# Patient Record
Sex: MALE | Race: OTHER | NOT HISPANIC OR LATINO | Employment: PART TIME | URBAN - METROPOLITAN AREA
[De-identification: names, ages, dates, MRNs, and addresses within clinical notes are randomized per-mention and may not be internally consistent; named-entity substitution may affect disease eponyms.]

---

## 2017-04-11 ENCOUNTER — OFFICE VISIT (OUTPATIENT)
Dept: URGENT CARE | Facility: CLINIC | Age: 57
End: 2017-04-11
Payer: OTHER MISCELLANEOUS

## 2017-04-11 VITALS
HEART RATE: 88 BPM | TEMPERATURE: 97.7 F | RESPIRATION RATE: 20 BRPM | DIASTOLIC BLOOD PRESSURE: 80 MMHG | SYSTOLIC BLOOD PRESSURE: 120 MMHG | WEIGHT: 245 LBS | OXYGEN SATURATION: 96 % | BODY MASS INDEX: 35.15 KG/M2

## 2017-04-11 DIAGNOSIS — M54.42 CHRONIC BILATERAL LOW BACK PAIN WITH BILATERAL SCIATICA: ICD-10-CM

## 2017-04-11 DIAGNOSIS — G89.29 CHRONIC BILATERAL LOW BACK PAIN WITH BILATERAL SCIATICA: ICD-10-CM

## 2017-04-11 DIAGNOSIS — M54.41 CHRONIC BILATERAL LOW BACK PAIN WITH BILATERAL SCIATICA: ICD-10-CM

## 2017-04-11 PROCEDURE — 99214 OFFICE O/P EST MOD 30 MIN: CPT | Performed by: PHYSICIAN ASSISTANT

## 2017-04-11 RX ORDER — GABAPENTIN 600 MG/1
600 TABLET ORAL 2 TIMES DAILY
Qty: 14 TAB | Refills: 0 | Status: SHIPPED | OUTPATIENT
Start: 2017-04-11 | End: 2017-04-18

## 2017-04-14 ASSESSMENT — ENCOUNTER SYMPTOMS
LOSS OF CONSCIOUSNESS: 0
SENSORY CHANGE: 1
FEVER: 0
FOCAL WEAKNESS: 0
WHEEZING: 0
LEG PAIN: 1
SORE THROAT: 0
EYE DISCHARGE: 0
ABDOMINAL PAIN: 0
DIARRHEA: 0
COUGH: 0
BACK PAIN: 1
EYE REDNESS: 0
SEIZURES: 0
TINGLING: 1
CHILLS: 0

## 2017-04-14 NOTE — PROGRESS NOTES
Subjective:      Johnie Jarrell is a 56 y.o. male who presents with Leg Pain          Pt is 55 y/o male who presents with continued low back pain with radiation to the back of his legs for the last 4-5 years. Pt admits that he is here today for some kind of relief to get him to his appt. With Dr. White neurosurgery in June. He has had several studies and referrals- it was suspected that this pt. Had claudication- however was without significant arterial disease noted on ultrasound. He also recently had an MRI of what he admits was of his lumbar spine- I am unable to see such in the chart. He has recently been to Dr. Castrejon who provided an injection into his hip/pelvis that provided momentary relief for this patient.   Pt. Has been to PT in the last few years for the same back pain. He denies any recent changes, injury, trauma or change from baseline for his typical back pain. He denies any new leg weakness. He reports that he mainly has the pain while standing in a certain position or while walking and will have pain shoot down his legs and calves. He will have some relief if he sits down in a certain position. He admits that Gabapentin has been helpful slightly in the past.      Leg Pain  This is a chronic problem. Episode onset: years. The problem occurs intermittently. The problem has been waxing and waning. Pertinent negatives include no abdominal pain, chest pain, chills, coughing, fever, rash or sore throat. The symptoms are aggravated by walking and standing. He has tried acetaminophen, ice, heat, NSAIDs, rest, oral narcotics, position changes and relaxation (Physical therapy. ) for the symptoms.       Review of Systems   Constitutional: Negative for fever, chills and malaise/fatigue.   HENT: Negative for sore throat.    Eyes: Negative for discharge and redness.   Respiratory: Negative for cough and wheezing.    Cardiovascular: Negative for chest pain and leg swelling.   Gastrointestinal: Negative for  abdominal pain and diarrhea.   Musculoskeletal: Positive for back pain. Negative for joint pain.   Skin: Negative for itching and rash.   Neurological: Positive for tingling and sensory change. Negative for focal weakness, seizures and loss of consciousness.          Objective:     /80 mmHg  Pulse 88  Temp(Src) 36.5 °C (97.7 °F)  Resp 20  Wt 111.131 kg (245 lb)  SpO2 96%   PMH:  has a past medical history of Allergy, unspecified not elsewhere classified. He also has no past medical history of Asthma.  MEDS:   Current outpatient prescriptions:   •  gabapentin (NEURONTIN) 600 MG tablet, Take 1 Tab by mouth 2 times a day for 7 days. Caution as may cause sedation, Disp: 14 Tab, Rfl: 0  •  Diclofenac Sodium 2 % Solution, Apply  to skin as directed., Disp: , Rfl:   ALLERGIES:   Allergies   Allergen Reactions   • Pcn [Penicillins]      SURGHX: History reviewed. No pertinent past surgical history.  SOCHX:  reports that he has never smoked. He has never used smokeless tobacco. He reports that he does not drink alcohol or use illicit drugs.  FH: Family history was reviewed, no pertinent findings to report    Physical Exam   Constitutional: He is oriented to person, place, and time. He appears well-developed and well-nourished.   HENT:   Head: Normocephalic and atraumatic.   Eyes: EOM are normal. Pupils are equal, round, and reactive to light.   Neck: Normal range of motion. Neck supple.   Cardiovascular: Normal rate.    Pulmonary/Chest: Effort normal and breath sounds normal.   Musculoskeletal:   Spine- without midline tenderness, step-off or deformity. Unable to illicit pain on exam today.  Without scoliosis or kyphosis. Without noted paraspinous spasm. FROM with lateral bend, and flexion/extension. Minimal tenderness over bilateral sacroiliac notches.  Sensation intact bilaterally, BLE motor 4/5 - noted quad atrophy. and symmetrical  strength. Pos.  Straight leg raise bilaterally   Gait- slow without foot drop.  Calves- without significant edema or erythema.    Neurological: He is alert and oriented to person, place, and time.   Skin: Skin is warm. No rash noted.   Psychiatric: He has a normal mood and affect. His behavior is normal.   Vitals reviewed.              Assessment/Plan:     1. Chronic bilateral low back pain with bilateral sciatica  - gabapentin (NEURONTIN) 600 MG tablet; Take 1 Tab by mouth 2 times a day for 7 days. Caution as may cause sedation  Dispense: 14 Tab; Refill: 0    Discussed at length today the role of the various specialist he has been evaluated by - it appears that he was sent to Dr. Castrejon to assist with pain management- and due to such referral- I do not feel comfortable writing controlled meds- will trial Gabapentin- as there is no change from baseline and without any new symptoms, injury or trauma- will defer further imaging at this time to specialist or his PCP-  pt. Is to trial Gabapentin and immediately alert his PCP of any change to symptoms and further monitoring.   He is to keep appt. With Dr. White in the near future- as it does sound like back pain with neuro claudication like symptoms- even spinal stenosis.   Pt was in agreement and will alert his PCP of his decision to no longer go to Dr. Castrejon.     Patient given precautionary s/sx that mandate immediate follow up and evaluation in the ED. Advised of risks of not doing so.    DDX, Supportive care, and indications for immediate follow-up discussed with patient.    Instructed to return to clinic or nearest emergency department if we are not available for any change in condition, further concerns, or worsening of symptoms.    The patient demonstrated a good understanding and agreed with the treatment plan.

## 2017-04-18 ENCOUNTER — OFFICE VISIT (OUTPATIENT)
Dept: MEDICAL GROUP | Age: 57
End: 2017-04-18
Payer: OTHER MISCELLANEOUS

## 2017-04-18 VITALS
BODY MASS INDEX: 35.55 KG/M2 | HEART RATE: 102 BPM | SYSTOLIC BLOOD PRESSURE: 117 MMHG | DIASTOLIC BLOOD PRESSURE: 80 MMHG | TEMPERATURE: 96.6 F | OXYGEN SATURATION: 96 % | HEIGHT: 69 IN | WEIGHT: 240 LBS

## 2017-04-18 DIAGNOSIS — G89.29 CHRONIC LEFT-SIDED LOW BACK PAIN WITH LEFT-SIDED SCIATICA: ICD-10-CM

## 2017-04-18 DIAGNOSIS — E66.9 OBESITY (BMI 30-39.9): ICD-10-CM

## 2017-04-18 DIAGNOSIS — E78.2 MIXED HYPERLIPIDEMIA: ICD-10-CM

## 2017-04-18 DIAGNOSIS — M54.42 CHRONIC LEFT-SIDED LOW BACK PAIN WITH LEFT-SIDED SCIATICA: ICD-10-CM

## 2017-04-18 PROCEDURE — 99214 OFFICE O/P EST MOD 30 MIN: CPT | Performed by: INTERNAL MEDICINE

## 2017-04-18 RX ORDER — CYCLOBENZAPRINE HCL 10 MG
10 TABLET ORAL 3 TIMES DAILY PRN
Qty: 30 TAB | Refills: 0 | Status: SHIPPED | OUTPATIENT
Start: 2017-04-18 | End: 2017-06-13

## 2017-04-18 RX ORDER — NAPROXEN 500 MG/1
500 TABLET ORAL 2 TIMES DAILY WITH MEALS
Qty: 60 TAB | Refills: 0 | Status: SHIPPED | OUTPATIENT
Start: 2017-04-18 | End: 2017-06-13

## 2017-04-18 ASSESSMENT — ENCOUNTER SYMPTOMS
NEUROLOGICAL NEGATIVE: 1
EYES NEGATIVE: 1
PSYCHIATRIC NEGATIVE: 1
RESPIRATORY NEGATIVE: 1
GASTROINTESTINAL NEGATIVE: 1
MUSCULOSKELETAL NEGATIVE: 1
CARDIOVASCULAR NEGATIVE: 1
CONSTITUTIONAL NEGATIVE: 1

## 2017-04-18 NOTE — PROGRESS NOTES
"Subjective:      Johnie Jarrell is a 56 y.o. male who presents with Referral Needed   the patient is here for follow-up of his chronic sacral pain radiating down the inner aspect of both legs for the last several months and wants referral to neurosurgeon for possible surgical management of this. He is followed by pain management Dr. Castrejon. He said Dr. Castrejon Him an injection in his groin which did not help in any.\He's not had any epidural injections apparently recently. He had an MRI in October which we don't have the results of that renal diagnostic center and also an MRI will of the pelvis which was done last month which showed bilateral acetabular impingement . The patient has not seen Dr. White yet. He says he wants to see him because a friend of his helped him with his pain. The patient denies any low back pain however. His only pain is in the rectal area and inner thighs.         HPI    Review of Systems   Constitutional: Negative.    HENT: Negative.    Eyes: Negative.    Respiratory: Negative.    Cardiovascular: Negative.    Gastrointestinal: Negative.    Genitourinary: Negative.    Musculoskeletal: Negative.    Skin: Negative.    Neurological: Negative.    Endo/Heme/Allergies: Negative.    Psychiatric/Behavioral: Negative.           Objective:     /80 mmHg  Pulse 102  Temp(Src) 35.9 °C (96.6 °F)  Ht 1.759 m (5' 9.25\")  Wt 108.863 kg (240 lb)  BMI 35.18 kg/m2  SpO2 96%     Physical Exam   Constitutional: He is oriented to person, place, and time. He appears well-developed and well-nourished. No distress.   HENT:   Head: Normocephalic and atraumatic.   Right Ear: External ear normal.   Left Ear: External ear normal.   Mouth/Throat: Oropharynx is clear and moist. No oropharyngeal exudate.   Eyes: Conjunctivae and EOM are normal. Pupils are equal, round, and reactive to light. Right eye exhibits no discharge.   Neck: Normal range of motion. Neck supple. No JVD present. No tracheal deviation " present. No thyromegaly present.   Cardiovascular: Normal rate, regular rhythm, normal heart sounds and intact distal pulses.  Exam reveals no gallop.    Pulmonary/Chest: Effort normal and breath sounds normal. No respiratory distress. He has no wheezes. He has no rales. He exhibits no tenderness.   Abdominal: Soft. Bowel sounds are normal. He exhibits no distension and no mass. There is no tenderness. There is no rebound and no guarding. No hernia.   Genitourinary: Guaiac negative stool. No penile tenderness.   Musculoskeletal: He exhibits no edema or tenderness.   Lymphadenopathy:     He has no cervical adenopathy.   Neurological: He is alert and oriented to person, place, and time. He has normal reflexes. He displays normal reflexes. No cranial nerve deficit. He exhibits normal muscle tone. Coordination normal.   Skin: Skin is warm and dry. No rash noted. He is not diaphoretic. No erythema. No pallor.   Psychiatric: He has a normal mood and affect. His behavior is normal. Judgment and thought content normal.   Nursing note and vitals reviewed.  No visits with results within 1 Month(s) from this visit.  Latest known visit with results is:    Office Visit on 03/30/2015   Component Date Value   • POC Color 03/30/2015 yellow    • POC Appearance 03/30/2015 clear    • POC Leukocyte Esterase 03/30/2015 neg    • POC Nitrites 03/30/2015 neg    • POC Urobiligen 03/30/2015 neg    • POC Protein 03/30/2015 neg    • POC Urine PH 03/30/2015 5    • POC Blood 03/30/2015 neg    • POC Specific Gravity 03/30/2015 1.015    • POC Ketones 03/30/2015 neg    • POC Biliruben 03/30/2015 neg    • POC Glucose 03/30/2015 neg       No results found for: HBA1C  Lab Results   Component Value Date/Time    SODIUM 141 10/29/2014 08:37 AM    POTASSIUM 3.9 10/29/2014 08:37 AM    CHLORIDE 107 10/29/2014 08:37 AM    CO2 28 10/29/2014 08:37 AM    GLUCOSE 91 10/29/2014 08:37 AM    BUN 13 10/29/2014 08:37 AM    CREATININE 0.99 10/29/2014 08:37 AM     ALKALINE PHOSPHATASE 58 10/29/2014 08:37 AM    AST(SGOT) 19 10/29/2014 08:37 AM    ALT(SGPT) 16 10/29/2014 08:37 AM    TOTAL BILIRUBIN 0.7 10/29/2014 08:37 AM     No results found for: INR  Lab Results   Component Value Date/Time    CHOLESTEROL, 10/29/2014 08:37 AM    * 10/29/2014 08:37 AM    HDL 50 10/29/2014 08:37 AM    TRIGLYCERIDES 84 10/29/2014 08:37 AM       No results found for: TESTOSTERONE  No results found for: TSH  No results found for: FREET4  No results found for: URICACID  No components found for: VITB12  No results found for: 25HYDROXY                Assessment/Plan:     1. Chronic left-sided low back pain with left-sided sciatica-the patient most likely has bilateral acetabular impingement syndrome causing his symptoms but cannot entirely exclude lumbosacral disc disease with radiculopathy. Would need to see the MRI results and report before making this diagnosis however. We'll refer him to the neurosurgeon at his request for second opinion. It may be the patient will actually need to see an orthopedic specialist if the problem is more related to his hip joint than to the LS-spine. Any type physical therapy is been ordered for both back and hip problems. Continue follow-up with his physical medicine doctor, Dr. Castrejon. In the meantime Flexeril and NSAIDs for pain and muscle spasm. Try to avoid opiates.     - REFERRAL TO NEUROSURGERY  - REFERRAL TO PHYSICAL THERAPY Reason for Therapy: Eval/Treat/Report             3. Mixed hyperlipidemia    diet/exercise/lose 15 lbs.; patient counseled      4. Obesity (BMI 30-39.9)      diet/exercise/lose 15 lbs.; patient counseled

## 2017-04-18 NOTE — MR AVS SNAPSHOT
"        Johnie Hernandezfredo   2017 1:00 PM   Office Visit   MRN: [id number]    Department:  [address] [place] [address]   Dept Phone:  [phone]    Description:  Male : 1960   Provider:  Karl Brito M.D.           Reason for Visit     Referral Needed Wants referall to Dr. White      Allergies as of 2017     Allergen Noted Reactions    Pcn [Penicillins] 2009         You were diagnosed with     Chronic left-sided low back pain with left-sided sciatica   [4937223]       BMI 36.0-36.9,adult   [862570]       Mixed hyperlipidemia   [272.2.ICD-9-CM]       Obesity (BMI 30-39.9)   [406890]         Vital Signs     Blood Pressure Pulse Temperature Height Weight Body Mass Index    117/80 mmHg 102 35.9 °C (96.6 °F) 1.759 m (5' 9.25\") 108.863 kg (240 lb) 35.18 kg/m2    Oxygen Saturation Smoking Status                96% Never Smoker           Basic Information     Date Of Birth Sex Race Ethnicity Preferred Language    1960 Male Other Unknown English      Your appointments     2017  5:30 PM   ACU GROUP with Harris Thorpe M.D.   [facility] (--)    [address] [facility].  [place] [id number] [phone]            May 20, 2017  7:00 AM   Adult Draw/Collection with [facility]   [facility] (--)    [address] [place] [address]   [phone]            2017  8:00 AM   Established Patient with Karl Brito M.D.   [address])     [place] [id number] [phone]           You will be receiving a confirmation call a few days before your appointment from our automated call confirmation system.              Problem List              ICD-10-CM Priority Class Noted - Resolved    HLD (hyperlipidemia) E78.5   2015 - Present    BMI 36.0-36.9,adult Z68.36   2016 - Present    Leg pain, left- since ; neg US venous, - ref PT M79.605   2016 - Present    Chronic left-sided low back pain " with left-sided sciatica M54.42, G89.29   12/8/2016 - Present    Obesity (BMI 30-39.9) E66.9   12/8/2016 - Present      Health Maintenance        Date Due Completion Dates    IMM DTaP/Tdap/Td Vaccine (1 - Tdap) 8/12/1981 ---    COLONOSCOPY 6/29/2025 6/29/2015            Current Immunizations     Influenza Vaccine Quad Inj (Pf) 12/8/2016      Below and/or attached are the medications your provider expects you to take. Review all of your home medications and newly ordered medications with your provider and/or pharmacist. Follow medication instructions as directed by your provider and/or pharmacist. Please keep your medication list with you and share with your provider. Update the information when medications are discontinued, doses are changed, or new medications (including over-the-counter products) are added; and carry medication information at all times in the event of emergency situations     Allergies:  PCN - (reactions not documented)               Medications  Valid as of: April 18, 2017 -  1:44 PM    Generic Name Brand Name Tablet Size Instructions for use    Cyclobenzaprine HCl (Tab) FLEXERIL 10 MG Take 1 Tab by mouth 3 times a day as needed.        Diclofenac Sodium (Solution) Diclofenac Sodium 2 % Apply  to skin as directed.        Gabapentin (Tab) NEURONTIN 600 MG Take 1 Tab by mouth 2 times a day for 7 days. Caution as may cause sedation        Naproxen (Tab) NAPROSYN 500 MG Take 1 Tab by mouth 2 times a day, with meals.        .                 Medicines prescribed today were sent to:     [id number] - [place] - [address] [facility]    [address] [facility] [address]    Phone: [phone] Fax: [phone]    Open 24 Hours?: No      Medication refill instructions:       If your prescription bottle indicates you have medication refills left, it is not necessary to call your provider’s office. Please contact your pharmacy and they will refill your medication.    If your prescription bottle indicates  you do not have any refills left, you may request refills at any time through one of the following ways: The online [employer] system (except Urgent Care), by calling your provider’s office, or by asking your pharmacy to contact your provider’s office with a refill request. Medication refills are processed only during regular business hours and may not be available until the next business day. Your provider may request additional information or to have a follow-up visit with you prior to refilling your medication.   *Please Note: Medication refills are assigned a new Rx number when refilled electronically. Your pharmacy may indicate that no refills were authorized even though a new prescription for the same medication is available at the pharmacy. Please request the medicine by name with the pharmacy before contacting your provider for a refill.        Referral     A referral request has been sent to our patient care coordination department. Please allow 3-5 business days for us to process this request and contact you either by phone or mail. If you do not hear from us by the 5th business day, please call us at [phone].           Elevate Access Code: Activation code not generated  Current Elevate Status: Active

## 2017-04-20 ENCOUNTER — APPOINTMENT (OUTPATIENT)
Dept: OTHER | Facility: IMAGING CENTER | Age: 57
End: 2017-04-20

## 2017-04-20 PROCEDURE — 97530 THERAPEUTIC ACTIVITIES: CPT | Performed by: FAMILY MEDICINE

## 2017-05-04 ENCOUNTER — APPOINTMENT (OUTPATIENT)
Dept: OTHER | Facility: IMAGING CENTER | Age: 57
End: 2017-05-04

## 2017-05-04 PROCEDURE — 97530 THERAPEUTIC ACTIVITIES: CPT | Performed by: FAMILY MEDICINE

## 2017-05-17 ENCOUNTER — OFFICE VISIT (OUTPATIENT)
Dept: OTHER | Facility: IMAGING CENTER | Age: 57
End: 2017-05-17
Payer: OTHER MISCELLANEOUS

## 2017-05-17 DIAGNOSIS — G89.29 CHRONIC LEFT-SIDED LOW BACK PAIN WITH LEFT-SIDED SCIATICA: Primary | ICD-10-CM

## 2017-05-17 DIAGNOSIS — M54.42 CHRONIC LEFT-SIDED LOW BACK PAIN WITH LEFT-SIDED SCIATICA: Primary | ICD-10-CM

## 2017-05-17 DIAGNOSIS — M48.062 SPINAL STENOSIS, LUMBAR REGION, WITH NEUROGENIC CLAUDICATION: ICD-10-CM

## 2017-05-17 PROCEDURE — 97811 ACUP 1/> W/O ESTIM EA ADD 15: CPT | Performed by: FAMILY MEDICINE

## 2017-05-17 PROCEDURE — 99213 OFFICE O/P EST LOW 20 MIN: CPT | Mod: 25 | Performed by: FAMILY MEDICINE

## 2017-05-17 PROCEDURE — 97813 ACUP 1/> W/ESTIM 1ST 15 MIN: CPT | Performed by: FAMILY MEDICINE

## 2017-05-17 NOTE — MR AVS SNAPSHOT
Johnie Wesly   2017 5:00 PM   Office Visit   MRN: [id number]    Department:  Acupuncture Med   Dept Phone:  [phone]    Description:  Male : 1960   Provider:  Harris Thorpe M.D.           Allergies as of 2017     Allergen Noted Reactions    Pcn [Penicillins] 2009         You were diagnosed with     Chronic left-sided low back pain with left-sided sciatica   [8758225]  -  Primary     Spinal stenosis, lumbar region, with neurogenic claudication   [724.03.ICD-9-CM]         Vital Signs     Smoking Status                   Never Smoker            Basic Information     Date Of Birth Sex Race Ethnicity Preferred Language    1960 Male Other Unknown English      Your appointments     May 20, 2017  7:00 AM   Adult Draw/Collection with LAB [facility]   [facility] (--)    [address] [place] [address]   [phone]            May 31, 2017  1:30 PM   ACU GROUP with Harris Thorpe M.D.   [facility] (--)    [address] [facility]  [place] [id number] [phone]            2017  8:00 AM   Established Patient with Karl Brito M.D.   [facility] GROUP [address] ([facility])    [address] [place] [id number] [phone]           You will be receiving a confirmation call a few days before your appointment from our automated call confirmation system.              Problem List              ICD-10-CM Priority Class Noted - Resolved    HLD (hyperlipidemia) E78.5   2015 - Present    BMI 36.0-36.9,adult Z68.36   2016 - Present    Leg pain, left- since ; neg US venous, - ref PT M79.605   2016 - Present    Chronic left-sided low back pain with left-sided sciatica M54.42, G89.29   2016 - Present    Obesity (BMI 30-39.9) E66.9   2016 - Present    Spinal stenosis, lumbar region, with neurogenic claudication M48.06   2017 - Present      Health Maintenance        Date Due Completion Dates    IMM  DTaP/Tdap/Td Vaccine (1 - Tdap) 8/12/1981 ---    COLONOSCOPY 6/29/2025 6/29/2015            Current Immunizations     Influenza Vaccine Quad Inj (Pf) 12/8/2016      Below and/or attached are the medications your provider expects you to take. Review all of your home medications and newly ordered medications with your provider and/or pharmacist. Follow medication instructions as directed by your provider and/or pharmacist. Please keep your medication list with you and share with your provider. Update the information when medications are discontinued, doses are changed, or new medications (including over-the-counter products) are added; and carry medication information at all times in the event of emergency situations     Allergies:  PCN - (reactions not documented)               Medications  Valid as of: May 17, 2017 -  5:58 PM    Generic Name Brand Name Tablet Size Instructions for use    Cyclobenzaprine HCl (Tab) FLEXERIL 10 MG Take 1 Tab by mouth 3 times a day as needed.        Diclofenac Sodium (Solution) Diclofenac Sodium 2 % Apply  to skin as directed.        Naproxen (Tab) NAPROSYN 500 MG Take 1 Tab by mouth 2 times a day, with meals.        .                 Medicines prescribed today were sent to:     [id number] [place] [place] [facility] [address] [facility]    [address] [facility] [address]    Phone: [phone] Fax: [phone]    Open 24 Hours?: No      Medication refill instructions:       If your prescription bottle indicates you have medication refills left, it is not necessary to call your provider’s office. Please contact your pharmacy and they will refill your medication.    If your prescription bottle indicates you do not have any refills left, you may request refills at any time through one of the following ways: The online [employer] system (except Urgent Care), by calling your provider’s office, or by asking your pharmacy to contact your provider’s office with a refill request. Medication refills are  processed only during regular business hours and may not be available until the next business day. Your provider may request additional information or to have a follow-up visit with you prior to refilling your medication.   *Please Note: Medication refills are assigned a new Rx number when refilled electronically. Your pharmacy may indicate that no refills were authorized even though a new prescription for the same medication is available at the pharmacy. Please request the medicine by name with the pharmacy before contacting your provider for a refill.        Instructions    The side effects of Acupuncture needle insertion include: minor bruising, bleeding, or pain at the site of needle insertion.  If more worrisome symptoms, such as continued bleeding, severe bruising, or continue pain or altered sensation persist, please contact [facility] office @ [phone]            Proviation Access Code: Activation code not generated  Current Proviation Status: Active

## 2017-05-18 NOTE — PROGRESS NOTES
[facility] Medical Acupuncture Progress Note  [address] [place] [address]  Dept: [phone]      Patient Name: Alessandra Jarrell   MRN: [id number]  YOB: 1960  PCP: Karl Brito M.D.  Date of Service: 5/17/2017  5:01 PM    CC New Patient - alessandra - previously seen in group acupuncture   HPI L groin burning.   No benefit from last treatment in Group acupuncture  Back is tight.  Medial thigh L is affected the most.   He states that he has burning to the medial L thigh.  He was checked for a hernia in the past with an MRI, and this was negative.  He has a recent MRI L-Spine as well, likely done at [facility]    He was told that he could get injections to the back or surgery, but he isn't interested in either at this point until he finishes a course of acupuncture    Notes from Neurosurgeon Dr. White reveal that the patient has a tight central canal stenosis of L3-L5, requiring surgery.  As the patient has asked for alternative methods, a referral was made to Dr. Bai for a L-Spine epidural.     ROS Favorite blue color like the dominick  - looks open and light.  Winter too cool.  Summer - likes the warmth  [place] born between [facility], [facility] ? Night ?3AM  Works in [employer] -   Used to play soccer =-  Like -  Soccer.    , Has 3 kids - 29, 27, 11.        PMH Past Medical History   Diagnosis Date   • Allergy, unspecified not elsewhere classified      No past surgical history on file.   SH Social History     Social History   • Marital Status:      Spouse Name: N/A   • Number of Children: N/A   • Years of Education: N/A     Social History Main Topics   • Smoking status: Never Smoker    • Smokeless tobacco: Never Used   • Alcohol Use: No   • Drug Use: No   • Sexual Activity:     Partners: Female     Other Topics Concern   • None     Social History Narrative      MEDS Current Outpatient Prescriptions on File Prior to Visit   Medication Sig Dispense Refill   • cyclobenzaprine  (FLEXERIL) 10 MG Tab Take 1 Tab by mouth 3 times a day as needed. 30 Tab 0   • naproxen (NAPROSYN) 500 MG Tab Take 1 Tab by mouth 2 times a day, with meals. 60 Tab 0   • Diclofenac Sodium 2 % Solution Apply  to skin as directed.       No current facility-administered medications on file prior to visit.      ALLERGIES Allergies   Allergen Reactions   • Pcn [Penicillins]       PE Pulses - not examined today  Tongue - not examined today     Assesment [place], Likely weak body  KI/ LR Stagnation Qi    Western Encounter Diagnoses   Name Primary?   • Chronic left-sided low back pain with left-sided sciatica Yes   • Spinal stenosis, lumbar region, with neurogenic claudication                   Plan Set 1: LLE - KI8-->+++KI10 / SP6-->+++SP9 / LR5-->+++LR8  Set 1: RLE BL59-->Bl40 / GB39-->BL34   Set 2: R ear BFA x 5  Set 3: Bilateral FMS  Set 4: Mateus Yin José Ralph 3:6   Have encouraged the patient to rest after the acupuncture session today - taking naps or going to sleep early as necessary.  Increase intake of water and refrain from strenuous activities.  Patient may expect to feel transient worsening of symptoms, but this should resolve to benefit in the next day or two after treatment.  >16 minutes of this 30 minute interview were spent in discussing the benefits and utility of acupuncture.  I answered patient questions about efficacy, safety, and what to expect during a treatment, and the likely number of treatments needed.  If not improved with this 3rd treatment of acupuncture, patient will consider epidural steroid and/or spinal surgery.   Total Acupuncture Time = 30 minutes    Harris Thorpe M.D.

## 2017-05-18 NOTE — PATIENT INSTRUCTIONS
The side effects of Acupuncture needle insertion include: minor bruising, bleeding, or pain at the site of needle insertion.  If more worrisome symptoms, such as continued bleeding, severe bruising, or continue pain or altered sensation persist, please contact [facility] office @ [phone]

## 2017-06-05 ENCOUNTER — HOSPITAL ENCOUNTER (OUTPATIENT)
Dept: LAB | Facility: MEDICAL CENTER | Age: 57
End: 2017-06-05
Attending: INTERNAL MEDICINE
Payer: OTHER MISCELLANEOUS

## 2017-06-05 DIAGNOSIS — E78.2 MIXED HYPERLIPIDEMIA: ICD-10-CM

## 2017-06-05 LAB
ALBUMIN SERPL BCP-MCNC: 4 G/DL (ref 3.2–4.9)
ALBUMIN/GLOB SERPL: 1.5 G/DL
ALP SERPL-CCNC: 57 U/L (ref 30–99)
ALT SERPL-CCNC: 14 U/L (ref 2–50)
ANION GAP SERPL CALC-SCNC: 5 MMOL/L (ref 0–11.9)
AST SERPL-CCNC: 15 U/L (ref 12–45)
BASOPHILS # BLD AUTO: 1.1 % (ref 0–1.8)
BASOPHILS # BLD: 0.08 K/UL (ref 0–0.12)
BILIRUB SERPL-MCNC: 0.6 MG/DL (ref 0.1–1.5)
BUN SERPL-MCNC: 17 MG/DL (ref 8–22)
CALCIUM SERPL-MCNC: 8.8 MG/DL (ref 8.5–10.5)
CHLORIDE SERPL-SCNC: 104 MMOL/L (ref 96–112)
CHOLEST SERPL-MCNC: 152 MG/DL (ref 100–199)
CO2 SERPL-SCNC: 32 MMOL/L (ref 20–33)
CREAT SERPL-MCNC: 0.96 MG/DL (ref 0.5–1.4)
EOSINOPHIL # BLD AUTO: 0.16 K/UL (ref 0–0.51)
EOSINOPHIL NFR BLD: 2.1 % (ref 0–6.9)
ERYTHROCYTE [DISTWIDTH] IN BLOOD BY AUTOMATED COUNT: 50.4 FL (ref 35.9–50)
GFR SERPL CREATININE-BSD FRML MDRD: >60 ML/MIN/1.73 M 2
GLOBULIN SER CALC-MCNC: 2.6 G/DL (ref 1.9–3.5)
GLUCOSE SERPL-MCNC: 106 MG/DL (ref 65–99)
HCT VFR BLD AUTO: 55.8 % (ref 42–52)
HDLC SERPL-MCNC: 56 MG/DL
HGB BLD-MCNC: 17.5 G/DL (ref 14–18)
IMM GRANULOCYTES # BLD AUTO: 0.03 K/UL (ref 0–0.11)
IMM GRANULOCYTES NFR BLD AUTO: 0.4 % (ref 0–0.9)
LDLC SERPL CALC-MCNC: 80 MG/DL
LYMPHOCYTES # BLD AUTO: 2.39 K/UL (ref 1–4.8)
LYMPHOCYTES NFR BLD: 31.9 % (ref 22–41)
MCH RBC QN AUTO: 29.4 PG (ref 27–33)
MCHC RBC AUTO-ENTMCNC: 31.4 G/DL (ref 33.7–35.3)
MCV RBC AUTO: 93.8 FL (ref 81.4–97.8)
MONOCYTES # BLD AUTO: 0.62 K/UL (ref 0–0.85)
MONOCYTES NFR BLD AUTO: 8.3 % (ref 0–13.4)
NEUTROPHILS # BLD AUTO: 4.22 K/UL (ref 1.82–7.42)
NEUTROPHILS NFR BLD: 56.2 % (ref 44–72)
NRBC # BLD AUTO: 0 K/UL
NRBC BLD AUTO-RTO: 0 /100 WBC
PLATELET # BLD AUTO: 131 K/UL (ref 164–446)
PMV BLD AUTO: 11.1 FL (ref 9–12.9)
POTASSIUM SERPL-SCNC: 4.2 MMOL/L (ref 3.6–5.5)
PROT SERPL-MCNC: 6.6 G/DL (ref 6–8.2)
RBC # BLD AUTO: 5.95 M/UL (ref 4.7–6.1)
SODIUM SERPL-SCNC: 141 MMOL/L (ref 135–145)
TRIGL SERPL-MCNC: 78 MG/DL (ref 0–149)
WBC # BLD AUTO: 7.5 K/UL (ref 4.8–10.8)

## 2017-06-05 PROCEDURE — 80053 COMPREHEN METABOLIC PANEL: CPT

## 2017-06-05 PROCEDURE — 36415 COLL VENOUS BLD VENIPUNCTURE: CPT

## 2017-06-05 PROCEDURE — 85025 COMPLETE CBC W/AUTO DIFF WBC: CPT

## 2017-06-05 PROCEDURE — 80061 LIPID PANEL: CPT

## 2017-06-13 ENCOUNTER — OFFICE VISIT (OUTPATIENT)
Dept: MEDICAL GROUP | Age: 57
End: 2017-06-13
Payer: OTHER MISCELLANEOUS

## 2017-06-13 VITALS
TEMPERATURE: 96.6 F | WEIGHT: 241 LBS | SYSTOLIC BLOOD PRESSURE: 115 MMHG | BODY MASS INDEX: 35.7 KG/M2 | OXYGEN SATURATION: 92 % | HEIGHT: 69 IN | DIASTOLIC BLOOD PRESSURE: 78 MMHG | HEART RATE: 94 BPM

## 2017-06-13 DIAGNOSIS — E78.2 MIXED HYPERLIPIDEMIA: ICD-10-CM

## 2017-06-13 DIAGNOSIS — G89.29 CHRONIC LEFT-SIDED LOW BACK PAIN WITH LEFT-SIDED SCIATICA: ICD-10-CM

## 2017-06-13 DIAGNOSIS — M79.605 LEG PAIN, LEFT: ICD-10-CM

## 2017-06-13 DIAGNOSIS — E66.9 OBESITY (BMI 30-39.9): ICD-10-CM

## 2017-06-13 DIAGNOSIS — M48.062 SPINAL STENOSIS, LUMBAR REGION, WITH NEUROGENIC CLAUDICATION: ICD-10-CM

## 2017-06-13 DIAGNOSIS — R73.01 IFG (IMPAIRED FASTING GLUCOSE): ICD-10-CM

## 2017-06-13 DIAGNOSIS — M54.42 CHRONIC LEFT-SIDED LOW BACK PAIN WITH LEFT-SIDED SCIATICA: ICD-10-CM

## 2017-06-13 PROCEDURE — 99214 OFFICE O/P EST MOD 30 MIN: CPT | Performed by: INTERNAL MEDICINE

## 2017-06-13 ASSESSMENT — ENCOUNTER SYMPTOMS
PSYCHIATRIC NEGATIVE: 1
NEUROLOGICAL NEGATIVE: 1
LEG PAIN: 1
RESPIRATORY NEGATIVE: 1
CARDIOVASCULAR NEGATIVE: 1
MUSCULOSKELETAL NEGATIVE: 1
GASTROINTESTINAL NEGATIVE: 1
EYES NEGATIVE: 1
CONSTITUTIONAL NEGATIVE: 1

## 2017-06-13 NOTE — MR AVS SNAPSHOT
"Johnie Jarrell   2017 8:00 AM   Office Visit   MRN: [id number]    Department:  [address] [place] [address]   Dept Phone:  [phone]    Description:  Male : 1960   Provider:  Karl Brito M.D.           Reason for Visit     Leg Pain follow up - lab review      Allergies as of 2017     Allergen Noted Reactions    Pcn [Penicillins] 2009         You were diagnosed with     Spinal stenosis, lumbar region, with neurogenic claudication   [724.03.ICD-9-CM]       Mixed hyperlipidemia   [272.2.ICD-9-CM]       Obesity (BMI 30-39.9)   [193576]       Chronic left-sided low back pain with left-sided sciatica   [3256467]       Leg pain, left   [718376]       IFG (impaired fasting glucose)   [016232]         Vital Signs     Blood Pressure Pulse Temperature Height Weight Body Mass Index    115/78 mmHg 94 35.9 °C (96.6 °F) 1.759 m (5' 9.25\") 109.317 kg (241 lb) 35.33 kg/m2    Oxygen Saturation Smoking Status                92% Never Smoker           Basic Information     Date Of Birth Sex Race Ethnicity Preferred Language    1960 Male Other Non- English      Your appointments     Dec 15, 2017  8:00 AM   Established Patient with Karl Brito M.D.   [address] [id number] [phone]           You will be receiving a confirmation call a few days before your appointment from our automated call confirmation system.              Problem List              ICD-10-CM Priority Class Noted - Resolved    Mixed hyperlipidemia E78.2   2015 - Present    Leg pain, left- since ; neg US venous, - ref pmr- no relief with groin and lumbar injectios; ref to neuro neuro surg dr young M79.605   2016 - Present    Chronic left-sided low back pain with left-sided sciatica M54.42, G89.29   2016 - Present    Obesity (BMI 30-39.9) E66.9   2016 - Present    Spinal stenosis, lumbar region, with neurogenic " claudication- dr young do laminectomy 2017 M48.06   5/17/2017 - Present    IFG (impaired fasting glucose) R73.01   6/13/2017 - Present      Health Maintenance        Date Due Completion Dates    IMM DTaP/Tdap/Td Vaccine (1 - Tdap) 8/12/1981 ---    COLONOSCOPY 6/29/2025 6/29/2015            Current Immunizations     Influenza Vaccine Quad Inj (Pf) 12/8/2016      Below and/or attached are the medications your provider expects you to take. Review all of your home medications and newly ordered medications with your provider and/or pharmacist. Follow medication instructions as directed by your provider and/or pharmacist. Please keep your medication list with you and share with your provider. Update the information when medications are discontinued, doses are changed, or new medications (including over-the-counter products) are added; and carry medication information at all times in the event of emergency situations     Allergies:  PCN - (reactions not documented)               Medications  Valid as of: June 13, 2017 -  8:38 AM    Generic Name Brand Name Tablet Size Instructions for use    Diclofenac Sodium (Solution) Diclofenac Sodium 2 % Apply  to skin as directed.        .                 Medicines prescribed today were sent to:     [id number] - [place] - [address] [facility]    [address] [facility] [address]    Phone: [phone] Fax: [phone]    Open 24 Hours?: No      Medication refill instructions:       If your prescription bottle indicates you have medication refills left, it is not necessary to call your provider’s office. Please contact your pharmacy and they will refill your medication.    If your prescription bottle indicates you do not have any refills left, you may request refills at any time through one of the following ways: The online [employer] system (except Urgent Care), by calling your provider’s office, or by asking your pharmacy to contact your provider’s office with a refill request.  Medication refills are processed only during regular business hours and may not be available until the next business day. Your provider may request additional information or to have a follow-up visit with you prior to refilling your medication.   *Please Note: Medication refills are assigned a new Rx number when refilled electronically. Your pharmacy may indicate that no refills were authorized even though a new prescription for the same medication is available at the pharmacy. Please request the medicine by name with the pharmacy before contacting your provider for a refill.        Your To Do List     Future Labs/Procedures Complete By Expires    CBC WITH DIFFERENTIAL  As directed 6/13/2018    COMP METABOLIC PANEL  As directed 6/13/2018    LIPID PROFILE  As directed 6/13/2018      Referral     A referral request has been sent to our patient care coordination department. Please allow 3-5 business days for us to process this request and contact you either by phone or mail. If you do not hear from us by the 5th business day, please call us at [phone].           [employer] Access Code: Activation code not generated  Current [employer] Status: Active

## 2017-06-13 NOTE — PROGRESS NOTES
Subjective:      Johnie Jarrell is a 56 y.o. male who presents with Leg Pain  The patient is here for followup of chronic medical problems listed below. The patient is compliant with medications and having no side effects from them. Denies chest pain, abdominal pain, dyspnea, myalgias, or cough.   Patient Active Problem List    Diagnosis Date Noted   • IFG (impaired fasting glucose) 06/13/2017   • Spinal stenosis, lumbar region, with neurogenic claudication- dr white do laminectomy 2017 05/17/2017   • Chronic left-sided low back pain with left-sided sciatica 12/08/2016   • Obesity (BMI 30-39.9) 12/08/2016   • Leg pain, left- since 2014; neg US venous, 2015- ref pmr- no relief with groin and lumbar injectios; ref to neuro neuro surg dr white 06/07/2016   • Mixed hyperlipidemia 12/08/2015     Allergies   Allergen Reactions   • Pcn [Penicillins]      Outpatient Prescriptions Prior to Visit   Medication Sig Dispense Refill   • cyclobenzaprine (FLEXERIL) 10 MG Tab Take 1 Tab by mouth 3 times a day as needed. (Patient not taking: Reported on 6/13/2017) 30 Tab 0   • naproxen (NAPROSYN) 500 MG Tab Take 1 Tab by mouth 2 times a day, with meals. (Patient not taking: Reported on 6/13/2017) 60 Tab 0   • Diclofenac Sodium 2 % Solution Apply  to skin as directed.       No facility-administered medications prior to visit.       Patient is also here to review his laboratory results from last week. His chief complaint, however is left groin pain which he's had for last 1-2 years and for which he is seeing a physical medicine doctor as well as a neurosurgeon, Dr. White. Although we do not have the results of his MRI from the LS spine that was done at [facility] last year, the patient tells me that he has a pinched nerve in LS spine  requiring neurosurgery since it did not respond to back injections by the physical medicine doctor. It is felt by his neurosurgeon that his groin pain is secondary to the spinal  "stenosis of the lumbar region. Laminectomy is anticipated for later this year. If he fails to continue to respond to conservative approaches. MRI of the pelvis did not show any significant abnormalities other than some mild acetabular impingement and hip joint. This was bilateral and actually worse on the right than on the left. This was done in January 2017.              Leg Pain        Review of Systems   Constitutional: Negative.    HENT: Negative.    Eyes: Negative.    Respiratory: Negative.    Cardiovascular: Negative.    Gastrointestinal: Negative.    Genitourinary: Negative.    Musculoskeletal: Negative.    Skin: Negative.    Neurological: Negative.    Endo/Heme/Allergies: Negative.    Psychiatric/Behavioral: Negative.           Objective:     /78 mmHg  Pulse 94  Temp(Src) 35.9 °C (96.6 °F)  Ht 1.759 m (5' 9.25\")  Wt 109.317 kg (241 lb)  BMI 35.33 kg/m2  SpO2 92%     Physical Exam   Constitutional: He is oriented to person, place, and time. He appears well-developed and well-nourished. No distress.   HENT:   Head: Normocephalic and atraumatic.   Right Ear: External ear normal.   Left Ear: External ear normal.   Mouth/Throat: Oropharynx is clear and moist. No oropharyngeal exudate.   Eyes: Conjunctivae and EOM are normal. Pupils are equal, round, and reactive to light. Right eye exhibits no discharge.   Neck: Normal range of motion. Neck supple. No JVD present. No tracheal deviation present. No thyromegaly present.   Cardiovascular: Normal rate, regular rhythm, normal heart sounds and intact distal pulses.  Exam reveals no gallop.    Pulmonary/Chest: Effort normal and breath sounds normal. No respiratory distress. He has no wheezes. He has no rales. He exhibits no tenderness.   Abdominal: Soft. Bowel sounds are normal. He exhibits no distension and no mass. There is no tenderness. There is no rebound and no guarding. No hernia.   Genitourinary: Guaiac negative stool. No penile tenderness. "   Musculoskeletal: He exhibits no edema or tenderness.   Lymphadenopathy:     He has no cervical adenopathy.   Neurological: He is alert and oriented to person, place, and time. He has normal reflexes. He displays normal reflexes. No cranial nerve deficit. He exhibits normal muscle tone. Coordination normal.   Skin: Skin is warm and dry. No rash noted. He is not diaphoretic. No erythema. No pallor.   Psychiatric: He has a normal mood and affect. His behavior is normal. Judgment and thought content normal.   Nursing note and vitals reviewed.    Hospital Outpatient Visit on 06/05/2017   Component Date Value   • Sodium 06/05/2017 141    • Potassium 06/05/2017 4.2    • Chloride 06/05/2017 104    • Co2 06/05/2017 32    • Anion Gap 06/05/2017 5.0    • Glucose 06/05/2017 106*   • Bun 06/05/2017 17    • Creatinine 06/05/2017 0.96    • Calcium 06/05/2017 8.8    • AST(SGOT) 06/05/2017 15    • ALT(SGPT) 06/05/2017 14    • Alkaline Phosphatase 06/05/2017 57    • Total Bilirubin 06/05/2017 0.6    • Albumin 06/05/2017 4.0    • Total Protein 06/05/2017 6.6    • Globulin 06/05/2017 2.6    • A-G Ratio 06/05/2017 1.5    • Cholesterol,Tot 06/05/2017 152    • Triglycerides 06/05/2017 78    • HDL 06/05/2017 56    • LDL 06/05/2017 80    • WBC 06/05/2017 7.5    • RBC 06/05/2017 5.95    • Hemoglobin 06/05/2017 17.5    • Hematocrit 06/05/2017 55.8*   • MCV 06/05/2017 93.8    • MCH 06/05/2017 29.4    • MCHC 06/05/2017 31.4*   • RDW 06/05/2017 50.4*   • Platelet Count 06/05/2017 131*   • MPV 06/05/2017 11.1    • Neutrophils-Polys 06/05/2017 56.20    • Lymphocytes 06/05/2017 31.90    • Monocytes 06/05/2017 8.30    • Eosinophils 06/05/2017 2.10    • Basophils 06/05/2017 1.10    • Immature Granulocytes 06/05/2017 0.40    • Nucleated RBC 06/05/2017 0.00    • Neutrophils (Absolute) 06/05/2017 4.22    • Lymphs (Absolute) 06/05/2017 2.39    • Monos (Absolute) 06/05/2017 0.62    • Eos (Absolute) 06/05/2017 0.16    • Baso (Absolute) 06/05/2017 0.08     • Immature Granulocytes (a* 06/05/2017 0.03    • NRBC (Absolute) 06/05/2017 0.00    • GFR If  06/05/2017 >60    • GFR If Non  Ameri* 06/05/2017 >60       No results found for: HBA1C  Lab Results   Component Value Date/Time    SODIUM 141 06/05/2017 06:40 AM    POTASSIUM 4.2 06/05/2017 06:40 AM    CHLORIDE 104 06/05/2017 06:40 AM    CO2 32 06/05/2017 06:40 AM    GLUCOSE 106* 06/05/2017 06:40 AM    BUN 17 06/05/2017 06:40 AM    CREATININE 0.96 06/05/2017 06:40 AM    ALKALINE PHOSPHATASE 57 06/05/2017 06:40 AM    AST(SGOT) 15 06/05/2017 06:40 AM    ALT(SGPT) 14 06/05/2017 06:40 AM    TOTAL BILIRUBIN 0.6 06/05/2017 06:40 AM     No results found for: INR  Lab Results   Component Value Date/Time    CHOLESTEROL, 06/05/2017 06:40 AM    LDL 80 06/05/2017 06:40 AM    HDL 56 06/05/2017 06:40 AM    TRIGLYCERIDES 78 06/05/2017 06:40 AM       No results found for: TESTOSTERONE  No results found for: TSH  No results found for: FREET4  No results found for: URICACID  No components found for: VITB12  No results found for: 25HYDROXY              Assessment/Plan:     1. Spinal stenosis, lumbar region, with neurogenic claudication-aspect patient would benefit from a neurosurgical approach to his spinal stenosis since it is causing some significant pain with walking in the left groin area. He is failed all conservative approaches with physical therapy and steroid injections both anteriorly and posteriorly, namely in the left groin and in the lumbosacral region. Will get the MRI report of the LS-spine from last year.    I have encouraged him to follow-up with his neurosurgeon. There is no significant tenderness in the left groin on palpation, and it only occurs with walking so neurogenic claudication would certainly seem a possible diagnosis and hopefully amenable to surgical approach.          2. Mixed hyperlipidemia-under good control without medication. We'll follow.  - COMP METABOLIC PANEL; Future  -  LIPID PROFILE; Future  - CBC WITH DIFFERENTIAL; Future    3. Obesity (BMI 30-39.9)-BMI equals 35. Patient needs at least a 25 pound weight reduction to get out of the obesity range which would be a 5 inch reduction around the waist. I counseled regarding proper nutrition, and advised him on either swimming or spinning for exercise aerobically and other exercises that he can do that will not have an adverse effect on his back. Has been referred to dietitian for further assistance with proper diet for weight reduction.     - REFERRAL TO NUTRITION SERVICES    4. Chronic left-sided low back pain with left-sided sciatica-as above       5. Leg pain, left- since 2014; neg US venous, 2015- ref PT-as above.        6. IFG (impaired fasting glucose)-as above. Warned of the high likelihood of progression to shiraz diabetes does not get his weight down below a BMI of 30.       - REFERRAL TO NUTRITION SERVICES    30 minute face-to-face encounter took place today.  More than half of this time was spent in the coordination of care of the above problems, as well as counseling.

## 2017-08-30 ENCOUNTER — HOSPITAL ENCOUNTER (OUTPATIENT)
Dept: RADIOLOGY | Facility: MEDICAL CENTER | Age: 57
End: 2017-08-30
Attending: NEUROLOGICAL SURGERY | Admitting: NEUROLOGICAL SURGERY
Payer: COMMERCIAL

## 2017-08-30 DIAGNOSIS — Z01.812 PRE-PROCEDURAL LABORATORY EXAMINATION: ICD-10-CM

## 2017-08-30 DIAGNOSIS — Z01.810 PRE-OPERATIVE CARDIOVASCULAR EXAMINATION: ICD-10-CM

## 2017-08-30 DIAGNOSIS — Z01.811 PRE-OPERATIVE RESPIRATORY EXAMINATION: ICD-10-CM

## 2017-08-30 LAB
ANION GAP SERPL CALC-SCNC: 7 MMOL/L (ref 0–11.9)
APPEARANCE UR: CLEAR
APTT PPP: 30.2 SEC (ref 24.7–36)
BASOPHILS # BLD AUTO: 0.7 % (ref 0–1.8)
BASOPHILS # BLD: 0.04 K/UL (ref 0–0.12)
BILIRUB UR QL STRIP.AUTO: NEGATIVE
BUN SERPL-MCNC: 17 MG/DL (ref 8–22)
CALCIUM SERPL-MCNC: 9.5 MG/DL (ref 8.5–10.5)
CHLORIDE SERPL-SCNC: 104 MMOL/L (ref 96–112)
CO2 SERPL-SCNC: 29 MMOL/L (ref 20–33)
COLOR UR: YELLOW
CREAT SERPL-MCNC: 1.06 MG/DL (ref 0.5–1.4)
CULTURE IF INDICATED INDCX: NO UA CULTURE
EKG IMPRESSION: NORMAL
EOSINOPHIL # BLD AUTO: 0.18 K/UL (ref 0–0.51)
EOSINOPHIL NFR BLD: 3.1 % (ref 0–6.9)
ERYTHROCYTE [DISTWIDTH] IN BLOOD BY AUTOMATED COUNT: 48.4 FL (ref 35.9–50)
GFR SERPL CREATININE-BSD FRML MDRD: >60 ML/MIN/1.73 M 2
GLUCOSE SERPL-MCNC: 83 MG/DL (ref 65–99)
GLUCOSE UR STRIP.AUTO-MCNC: NEGATIVE MG/DL
HCT VFR BLD AUTO: 58.7 % (ref 42–52)
HGB BLD-MCNC: 18.8 G/DL (ref 14–18)
IMM GRANULOCYTES # BLD AUTO: 0.01 K/UL (ref 0–0.11)
IMM GRANULOCYTES NFR BLD AUTO: 0.2 % (ref 0–0.9)
INR PPP: 0.95 (ref 0.87–1.13)
KETONES UR STRIP.AUTO-MCNC: NEGATIVE MG/DL
LEUKOCYTE ESTERASE UR QL STRIP.AUTO: NEGATIVE
LYMPHOCYTES # BLD AUTO: 2.01 K/UL (ref 1–4.8)
LYMPHOCYTES NFR BLD: 35 % (ref 22–41)
MCH RBC QN AUTO: 29.3 PG (ref 27–33)
MCHC RBC AUTO-ENTMCNC: 32 G/DL (ref 33.7–35.3)
MCV RBC AUTO: 91.4 FL (ref 81.4–97.8)
MICRO URNS: NORMAL
MONOCYTES # BLD AUTO: 0.51 K/UL (ref 0–0.85)
MONOCYTES NFR BLD AUTO: 8.9 % (ref 0–13.4)
NEUTROPHILS # BLD AUTO: 3 K/UL (ref 1.82–7.42)
NEUTROPHILS NFR BLD: 52.1 % (ref 44–72)
NITRITE UR QL STRIP.AUTO: NEGATIVE
NRBC # BLD AUTO: 0 K/UL
NRBC BLD AUTO-RTO: 0 /100 WBC
PH UR STRIP.AUTO: 7 [PH]
PLATELET # BLD AUTO: 129 K/UL (ref 164–446)
PMV BLD AUTO: 10.8 FL (ref 9–12.9)
POTASSIUM SERPL-SCNC: 3.9 MMOL/L (ref 3.6–5.5)
PROT UR QL STRIP: NEGATIVE MG/DL
PROTHROMBIN TIME: 13 SEC (ref 12–14.6)
RBC # BLD AUTO: 6.42 M/UL (ref 4.7–6.1)
RBC UR QL AUTO: NEGATIVE
SODIUM SERPL-SCNC: 140 MMOL/L (ref 135–145)
SP GR UR STRIP.AUTO: 1.02
UROBILINOGEN UR STRIP.AUTO-MCNC: 0.2 MG/DL
WBC # BLD AUTO: 5.8 K/UL (ref 4.8–10.8)

## 2017-08-30 PROCEDURE — 93010 ELECTROCARDIOGRAM REPORT: CPT | Performed by: INTERNAL MEDICINE

## 2017-08-30 PROCEDURE — 71020 DX-CHEST-2 VIEWS: CPT

## 2017-08-30 PROCEDURE — 85610 PROTHROMBIN TIME: CPT

## 2017-08-30 PROCEDURE — 36415 COLL VENOUS BLD VENIPUNCTURE: CPT

## 2017-08-30 PROCEDURE — 81003 URINALYSIS AUTO W/O SCOPE: CPT

## 2017-08-30 PROCEDURE — 85025 COMPLETE CBC W/AUTO DIFF WBC: CPT

## 2017-08-30 PROCEDURE — 85730 THROMBOPLASTIN TIME PARTIAL: CPT

## 2017-08-30 PROCEDURE — 93005 ELECTROCARDIOGRAM TRACING: CPT

## 2017-08-30 PROCEDURE — 80048 BASIC METABOLIC PNL TOTAL CA: CPT

## 2017-08-30 RX ORDER — IBUPROFEN 200 MG
200 TABLET ORAL EVERY 6 HOURS PRN
Status: ON HOLD | COMMUNITY
End: 2017-09-15

## 2017-09-14 ENCOUNTER — APPOINTMENT (OUTPATIENT)
Dept: RADIOLOGY | Facility: MEDICAL CENTER | Age: 57
End: 2017-09-14
Attending: NEUROLOGICAL SURGERY
Payer: COMMERCIAL

## 2017-09-14 ENCOUNTER — HOSPITAL ENCOUNTER (OUTPATIENT)
Facility: MEDICAL CENTER | Age: 57
End: 2017-09-15
Attending: NEUROLOGICAL SURGERY | Admitting: NEUROLOGICAL SURGERY
Payer: COMMERCIAL

## 2017-09-14 PROBLEM — M48.061 SPINAL STENOSIS, LUMBAR REGION, WITHOUT NEUROGENIC CLAUDICATION: Status: ACTIVE | Noted: 2017-09-14

## 2017-09-14 PROCEDURE — 160009 HCHG ANES TIME/MIN: Performed by: NEUROLOGICAL SURGERY

## 2017-09-14 PROCEDURE — 72020 X-RAY EXAM OF SPINE 1 VIEW: CPT

## 2017-09-14 PROCEDURE — 700102 HCHG RX REV CODE 250 W/ 637 OVERRIDE(OP)

## 2017-09-14 PROCEDURE — 500367 HCHG DRAIN KIT, HEMOVAC: Performed by: NEUROLOGICAL SURGERY

## 2017-09-14 PROCEDURE — 502626 HCHG SURGIFLO HEMOSTATIC MATRIX 6ML: Performed by: NEUROLOGICAL SURGERY

## 2017-09-14 PROCEDURE — 700111 HCHG RX REV CODE 636 W/ 250 OVERRIDE (IP)

## 2017-09-14 PROCEDURE — 160029 HCHG SURGERY MINUTES - 1ST 30 MINS LEVEL 4: Performed by: NEUROLOGICAL SURGERY

## 2017-09-14 PROCEDURE — A9270 NON-COVERED ITEM OR SERVICE: HCPCS

## 2017-09-14 PROCEDURE — 700102 HCHG RX REV CODE 250 W/ 637 OVERRIDE(OP): Performed by: NURSE PRACTITIONER

## 2017-09-14 PROCEDURE — 160036 HCHG PACU - EA ADDL 30 MINS PHASE I: Performed by: NEUROLOGICAL SURGERY

## 2017-09-14 PROCEDURE — 501899 HCHG DURASEAL SEALANT SYSTEM 5ML: Performed by: NEUROLOGICAL SURGERY

## 2017-09-14 PROCEDURE — 160035 HCHG PACU - 1ST 60 MINS PHASE I: Performed by: NEUROLOGICAL SURGERY

## 2017-09-14 PROCEDURE — 700101 HCHG RX REV CODE 250

## 2017-09-14 PROCEDURE — A6223 GAUZE >16<=48 NO W/SAL W/O B: HCPCS | Performed by: NEUROLOGICAL SURGERY

## 2017-09-14 PROCEDURE — 160002 HCHG RECOVERY MINUTES (STAT): Performed by: NEUROLOGICAL SURGERY

## 2017-09-14 PROCEDURE — 501838 HCHG SUTURE GENERAL: Performed by: NEUROLOGICAL SURGERY

## 2017-09-14 PROCEDURE — 160041 HCHG SURGERY MINUTES - EA ADDL 1 MIN LEVEL 4: Performed by: NEUROLOGICAL SURGERY

## 2017-09-14 PROCEDURE — A9270 NON-COVERED ITEM OR SERVICE: HCPCS | Performed by: NURSE PRACTITIONER

## 2017-09-14 PROCEDURE — 700112 HCHG RX REV CODE 229: Performed by: NURSE PRACTITIONER

## 2017-09-14 PROCEDURE — 501837 HCHG SUTURE CV: Performed by: NEUROLOGICAL SURGERY

## 2017-09-14 PROCEDURE — 160048 HCHG OR STATISTICAL LEVEL 1-5: Performed by: NEUROLOGICAL SURGERY

## 2017-09-14 PROCEDURE — G0378 HOSPITAL OBSERVATION PER HR: HCPCS

## 2017-09-14 DEVICE — DURASEAL SEALANT SYSTEM 5ML - (5/BX): Type: IMPLANTABLE DEVICE | Site: BACK | Status: FUNCTIONAL

## 2017-09-14 RX ORDER — DIAZEPAM 2 MG/1
5 TABLET ORAL EVERY 8 HOURS PRN
Status: DISCONTINUED | OUTPATIENT
Start: 2017-09-14 | End: 2017-09-15 | Stop reason: HOSPADM

## 2017-09-14 RX ORDER — CEFAZOLIN SODIUM 2 G/100ML
2 INJECTION, SOLUTION INTRAVENOUS EVERY 8 HOURS
Status: COMPLETED | OUTPATIENT
Start: 2017-09-15 | End: 2017-09-15

## 2017-09-14 RX ORDER — MIDAZOLAM HYDROCHLORIDE 1 MG/ML
INJECTION INTRAMUSCULAR; INTRAVENOUS
Status: DISPENSED
Start: 2017-09-14 | End: 2017-09-15

## 2017-09-14 RX ORDER — ONDANSETRON 4 MG/1
4 TABLET, ORALLY DISINTEGRATING ORAL EVERY 4 HOURS PRN
Status: DISCONTINUED | OUTPATIENT
Start: 2017-09-14 | End: 2017-09-15 | Stop reason: HOSPADM

## 2017-09-14 RX ORDER — MORPHINE SULFATE 4 MG/ML
2 INJECTION, SOLUTION INTRAMUSCULAR; INTRAVENOUS
Status: DISCONTINUED | OUTPATIENT
Start: 2017-09-14 | End: 2017-09-15 | Stop reason: HOSPADM

## 2017-09-14 RX ORDER — TIZANIDINE 4 MG/1
2 TABLET ORAL 3 TIMES DAILY PRN
Status: DISCONTINUED | OUTPATIENT
Start: 2017-09-14 | End: 2017-09-15 | Stop reason: HOSPADM

## 2017-09-14 RX ORDER — LIDOCAINE HYDROCHLORIDE 10 MG/ML
INJECTION, SOLUTION INFILTRATION; PERINEURAL
Status: COMPLETED
Start: 2017-09-14 | End: 2017-09-14

## 2017-09-14 RX ORDER — BACITRACIN 50000 [IU]/1
INJECTION, POWDER, FOR SOLUTION INTRAMUSCULAR
Status: DISCONTINUED | OUTPATIENT
Start: 2017-09-14 | End: 2017-09-14 | Stop reason: HOSPADM

## 2017-09-14 RX ORDER — POLYETHYLENE GLYCOL 3350 17 G/17G
1 POWDER, FOR SOLUTION ORAL 2 TIMES DAILY PRN
Status: DISCONTINUED | OUTPATIENT
Start: 2017-09-14 | End: 2017-09-15 | Stop reason: HOSPADM

## 2017-09-14 RX ORDER — ENEMA 19; 7 G/133ML; G/133ML
1 ENEMA RECTAL
Status: DISCONTINUED | OUTPATIENT
Start: 2017-09-14 | End: 2017-09-15 | Stop reason: HOSPADM

## 2017-09-14 RX ORDER — CALCIUM CARBONATE 500 MG/1
500 TABLET, CHEWABLE ORAL 2 TIMES DAILY
Status: DISCONTINUED | OUTPATIENT
Start: 2017-09-14 | End: 2017-09-15 | Stop reason: HOSPADM

## 2017-09-14 RX ORDER — BISACODYL 10 MG
10 SUPPOSITORY, RECTAL RECTAL
Status: DISCONTINUED | OUTPATIENT
Start: 2017-09-14 | End: 2017-09-15 | Stop reason: HOSPADM

## 2017-09-14 RX ORDER — SODIUM CHLORIDE, SODIUM LACTATE, POTASSIUM CHLORIDE, CALCIUM CHLORIDE 600; 310; 30; 20 MG/100ML; MG/100ML; MG/100ML; MG/100ML
INJECTION, SOLUTION INTRAVENOUS CONTINUOUS
Status: DISCONTINUED | OUTPATIENT
Start: 2017-09-14 | End: 2017-09-15 | Stop reason: HOSPADM

## 2017-09-14 RX ORDER — DIPHENHYDRAMINE HCL 25 MG
25 TABLET ORAL EVERY 6 HOURS PRN
Status: DISCONTINUED | OUTPATIENT
Start: 2017-09-14 | End: 2017-09-15 | Stop reason: HOSPADM

## 2017-09-14 RX ORDER — BUPIVACAINE HYDROCHLORIDE AND EPINEPHRINE 5; 5 MG/ML; UG/ML
INJECTION, SOLUTION EPIDURAL; INTRACAUDAL; PERINEURAL
Status: DISCONTINUED | OUTPATIENT
Start: 2017-09-14 | End: 2017-09-14 | Stop reason: HOSPADM

## 2017-09-14 RX ORDER — LIDOCAINE AND PRILOCAINE 25; 25 MG/G; MG/G
1 CREAM TOPICAL
Status: DISCONTINUED | OUTPATIENT
Start: 2017-09-14 | End: 2017-09-15 | Stop reason: HOSPADM

## 2017-09-14 RX ORDER — DIPHENHYDRAMINE HYDROCHLORIDE 50 MG/ML
25 INJECTION INTRAMUSCULAR; INTRAVENOUS EVERY 6 HOURS PRN
Status: DISCONTINUED | OUTPATIENT
Start: 2017-09-14 | End: 2017-09-15 | Stop reason: HOSPADM

## 2017-09-14 RX ORDER — SODIUM CHLORIDE AND POTASSIUM CHLORIDE 150; 900 MG/100ML; MG/100ML
INJECTION, SOLUTION INTRAVENOUS CONTINUOUS
Status: DISCONTINUED | OUTPATIENT
Start: 2017-09-14 | End: 2017-09-15 | Stop reason: HOSPADM

## 2017-09-14 RX ORDER — CLONIDINE HYDROCHLORIDE 0.1 MG/1
0.1 TABLET ORAL EVERY 4 HOURS PRN
Status: DISCONTINUED | OUTPATIENT
Start: 2017-09-14 | End: 2017-09-15 | Stop reason: HOSPADM

## 2017-09-14 RX ORDER — OXYCODONE HYDROCHLORIDE 10 MG/1
10 TABLET ORAL
Status: DISCONTINUED | OUTPATIENT
Start: 2017-09-14 | End: 2017-09-15 | Stop reason: HOSPADM

## 2017-09-14 RX ORDER — OXYCODONE HCL 5 MG/5 ML
SOLUTION, ORAL ORAL
Status: COMPLETED
Start: 2017-09-14 | End: 2017-09-14

## 2017-09-14 RX ORDER — GABAPENTIN 300 MG/1
CAPSULE ORAL
Status: COMPLETED
Start: 2017-09-14 | End: 2017-09-14

## 2017-09-14 RX ORDER — CELECOXIB 200 MG/1
CAPSULE ORAL
Status: COMPLETED
Start: 2017-09-14 | End: 2017-09-14

## 2017-09-14 RX ORDER — ONDANSETRON 2 MG/ML
4 INJECTION INTRAMUSCULAR; INTRAVENOUS EVERY 4 HOURS PRN
Status: DISCONTINUED | OUTPATIENT
Start: 2017-09-14 | End: 2017-09-15 | Stop reason: HOSPADM

## 2017-09-14 RX ORDER — AMOXICILLIN 250 MG
1 CAPSULE ORAL NIGHTLY
Status: DISCONTINUED | OUTPATIENT
Start: 2017-09-14 | End: 2017-09-15 | Stop reason: HOSPADM

## 2017-09-14 RX ORDER — AMOXICILLIN 250 MG
1 CAPSULE ORAL
Status: DISCONTINUED | OUTPATIENT
Start: 2017-09-14 | End: 2017-09-15 | Stop reason: HOSPADM

## 2017-09-14 RX ORDER — LIDOCAINE HYDROCHLORIDE 10 MG/ML
0.5 INJECTION, SOLUTION INFILTRATION; PERINEURAL
Status: DISCONTINUED | OUTPATIENT
Start: 2017-09-14 | End: 2017-09-15 | Stop reason: HOSPADM

## 2017-09-14 RX ORDER — ACETAMINOPHEN 500 MG
TABLET ORAL
Status: COMPLETED
Start: 2017-09-14 | End: 2017-09-14

## 2017-09-14 RX ORDER — DOCUSATE SODIUM 100 MG/1
100 CAPSULE, LIQUID FILLED ORAL 2 TIMES DAILY
Status: DISCONTINUED | OUTPATIENT
Start: 2017-09-14 | End: 2017-09-15 | Stop reason: HOSPADM

## 2017-09-14 RX ADMIN — ANTACID TABLETS 500 MG: 500 TABLET, CHEWABLE ORAL at 20:32

## 2017-09-14 RX ADMIN — OXYCODONE HYDROCHLORIDE 10 MG: 10 TABLET ORAL at 21:53

## 2017-09-14 RX ADMIN — ACETAMINOPHEN 1000 MG: 500 TABLET, FILM COATED ORAL at 12:45

## 2017-09-14 RX ADMIN — STANDARDIZED SENNA CONCENTRATE AND DOCUSATE SODIUM 1 TABLET: 8.6; 5 TABLET, FILM COATED ORAL at 20:32

## 2017-09-14 RX ADMIN — LIDOCAINE HYDROCHLORIDE: 10 INJECTION, SOLUTION INFILTRATION; PERINEURAL at 12:45

## 2017-09-14 RX ADMIN — OXYCODONE HYDROCHLORIDE 5 MG: 5 SOLUTION ORAL at 19:08

## 2017-09-14 RX ADMIN — FENTANYL CITRATE 25 MCG: 50 INJECTION, SOLUTION INTRAMUSCULAR; INTRAVENOUS at 19:35

## 2017-09-14 RX ADMIN — DOCUSATE SODIUM 100 MG: 100 CAPSULE ORAL at 20:32

## 2017-09-14 RX ADMIN — FENTANYL CITRATE 25 MCG: 50 INJECTION, SOLUTION INTRAMUSCULAR; INTRAVENOUS at 19:00

## 2017-09-14 RX ADMIN — GABAPENTIN 300 MG: 300 CAPSULE ORAL at 12:45

## 2017-09-14 RX ADMIN — SODIUM CHLORIDE, SODIUM LACTATE, POTASSIUM CHLORIDE, CALCIUM CHLORIDE: 600; 310; 30; 20 INJECTION, SOLUTION INTRAVENOUS at 13:00

## 2017-09-14 RX ADMIN — OXYCODONE HYDROCHLORIDE 5 MG: 5 SOLUTION ORAL at 19:15

## 2017-09-14 RX ADMIN — CELECOXIB 400 MG: 200 CAPSULE ORAL at 12:45

## 2017-09-14 RX ADMIN — FENTANYL CITRATE 25 MCG: 50 INJECTION, SOLUTION INTRAMUSCULAR; INTRAVENOUS at 19:15

## 2017-09-14 ASSESSMENT — PATIENT HEALTH QUESTIONNAIRE - PHQ9
SUM OF ALL RESPONSES TO PHQ QUESTIONS 1-9: 0
1. LITTLE INTEREST OR PLEASURE IN DOING THINGS: NOT AT ALL
2. FEELING DOWN, DEPRESSED, IRRITABLE, OR HOPELESS: NOT AT ALL
SUM OF ALL RESPONSES TO PHQ9 QUESTIONS 1 AND 2: 0

## 2017-09-14 ASSESSMENT — PAIN SCALES - GENERAL
PAINLEVEL_OUTOF10: 4
PAINLEVEL_OUTOF10: ASSUMED PAIN PRESENT
PAINLEVEL_OUTOF10: 5
PAINLEVEL_OUTOF10: 8
PAINLEVEL_OUTOF10: 4
PAINLEVEL_OUTOF10: 6

## 2017-09-14 ASSESSMENT — LIFESTYLE VARIABLES
ON A TYPICAL DAY WHEN YOU DRINK ALCOHOL HOW MANY DRINKS DO YOU HAVE: 1
EVER HAD A DRINK FIRST THING IN THE MORNING TO STEADY YOUR NERVES TO GET RID OF A HANGOVER: NO
HAVE PEOPLE ANNOYED YOU BY CRITICIZING YOUR DRINKING: NO
CONSUMPTION TOTAL: NEGATIVE
HAVE YOU EVER FELT YOU SHOULD CUT DOWN ON YOUR DRINKING: NO
ALCOHOL_USE: YES
HOW MANY TIMES IN THE PAST YEAR HAVE YOU HAD 5 OR MORE DRINKS IN A DAY: 0
TOTAL SCORE: 0
TOTAL SCORE: 0
EVER_SMOKED: NEVER
EVER FELT BAD OR GUILTY ABOUT YOUR DRINKING: NO
TOTAL SCORE: 0
AVERAGE NUMBER OF DAYS PER WEEK YOU HAVE A DRINK CONTAINING ALCOHOL: 0

## 2017-09-14 NOTE — PROGRESS NOTES
The Medication Reconciliation process has been completed by interviewing the patient    Allergies have been reviewed  Antibiotic use in 30 days - none    Home Pharm[facility]:  [facility]

## 2017-09-15 VITALS
HEART RATE: 93 BPM | DIASTOLIC BLOOD PRESSURE: 65 MMHG | HEIGHT: 70 IN | OXYGEN SATURATION: 93 % | TEMPERATURE: 97.5 F | RESPIRATION RATE: 18 BRPM | SYSTOLIC BLOOD PRESSURE: 114 MMHG | BODY MASS INDEX: 35.32 KG/M2 | WEIGHT: 246.69 LBS

## 2017-09-15 PROCEDURE — A9270 NON-COVERED ITEM OR SERVICE: HCPCS | Performed by: NURSE PRACTITIONER

## 2017-09-15 PROCEDURE — G8987 SELF CARE CURRENT STATUS: HCPCS | Mod: CI

## 2017-09-15 PROCEDURE — G8978 MOBILITY CURRENT STATUS: HCPCS | Mod: CI

## 2017-09-15 PROCEDURE — 700111 HCHG RX REV CODE 636 W/ 250 OVERRIDE (IP): Performed by: NURSE PRACTITIONER

## 2017-09-15 PROCEDURE — 700112 HCHG RX REV CODE 229: Performed by: NURSE PRACTITIONER

## 2017-09-15 PROCEDURE — 96374 THER/PROPH/DIAG INJ IV PUSH: CPT

## 2017-09-15 PROCEDURE — 700102 HCHG RX REV CODE 250 W/ 637 OVERRIDE(OP): Performed by: NURSE PRACTITIONER

## 2017-09-15 PROCEDURE — G8989 SELF CARE D/C STATUS: HCPCS | Mod: CI

## 2017-09-15 PROCEDURE — G8979 MOBILITY GOAL STATUS: HCPCS | Mod: CI

## 2017-09-15 PROCEDURE — 96376 TX/PRO/DX INJ SAME DRUG ADON: CPT

## 2017-09-15 PROCEDURE — G8988 SELF CARE GOAL STATUS: HCPCS | Mod: CI

## 2017-09-15 PROCEDURE — G0378 HOSPITAL OBSERVATION PER HR: HCPCS

## 2017-09-15 PROCEDURE — 97161 PT EVAL LOW COMPLEX 20 MIN: CPT

## 2017-09-15 PROCEDURE — 97165 OT EVAL LOW COMPLEX 30 MIN: CPT | Mod: XE

## 2017-09-15 PROCEDURE — 700101 HCHG RX REV CODE 250: Performed by: NURSE PRACTITIONER

## 2017-09-15 PROCEDURE — G8980 MOBILITY D/C STATUS: HCPCS | Mod: CI

## 2017-09-15 RX ORDER — TIZANIDINE 2 MG/1
2 TABLET ORAL EVERY 6 HOURS PRN
Qty: 60 TAB | Refills: 0 | Status: SHIPPED | OUTPATIENT
Start: 2017-09-15 | End: 2024-01-01

## 2017-09-15 RX ORDER — OXYCODONE HYDROCHLORIDE 10 MG/1
10 TABLET ORAL EVERY 4 HOURS PRN
Qty: 80 TAB | Refills: 0 | Status: SHIPPED | OUTPATIENT
Start: 2017-09-15

## 2017-09-15 RX ADMIN — OXYCODONE HYDROCHLORIDE 10 MG: 10 TABLET ORAL at 07:56

## 2017-09-15 RX ADMIN — CEFAZOLIN SODIUM 2 G: 2 INJECTION, SOLUTION INTRAVENOUS at 07:57

## 2017-09-15 RX ADMIN — ANTACID TABLETS 500 MG: 500 TABLET, CHEWABLE ORAL at 07:57

## 2017-09-15 RX ADMIN — DOCUSATE SODIUM 100 MG: 100 CAPSULE ORAL at 07:57

## 2017-09-15 RX ADMIN — POTASSIUM CHLORIDE AND SODIUM CHLORIDE: 900; 150 INJECTION, SOLUTION INTRAVENOUS at 01:27

## 2017-09-15 RX ADMIN — CEFAZOLIN SODIUM 2 G: 2 INJECTION, SOLUTION INTRAVENOUS at 01:30

## 2017-09-15 RX ADMIN — OXYCODONE HYDROCHLORIDE 10 MG: 10 TABLET ORAL at 12:50

## 2017-09-15 ASSESSMENT — GAIT ASSESSMENTS
DISTANCE (FEET): 220
GAIT LEVEL OF ASSIST: SUPERVISED

## 2017-09-15 ASSESSMENT — COGNITIVE AND FUNCTIONAL STATUS - GENERAL
HELP NEEDED FOR BATHING: A LITTLE
MOVING TO AND FROM BED TO CHAIR: A LITTLE
SUGGESTED CMS G CODE MODIFIER DAILY ACTIVITY: CJ
DRESSING REGULAR LOWER BODY CLOTHING: A LITTLE
MOBILITY SCORE: 22
DAILY ACTIVITIY SCORE: 22
TURNING FROM BACK TO SIDE WHILE IN FLAT BAD: A LITTLE
SUGGESTED CMS G CODE MODIFIER MOBILITY: CJ

## 2017-09-15 ASSESSMENT — PAIN SCALES - GENERAL
PAINLEVEL_OUTOF10: 5
PAINLEVEL_OUTOF10: 2
PAINLEVEL_OUTOF10: 7
PAINLEVEL_OUTOF10: 8

## 2017-09-15 ASSESSMENT — ACTIVITIES OF DAILY LIVING (ADL): TOILETING: INDEPENDENT

## 2017-09-15 NOTE — PROGRESS NOTES
Received report and assumed pt care when he arrived to the unit on [facility] at 2000.  A&O x4.  Bed alarm and treaded socks on.  Call light and personal belongings within reach.  Must lie flat until midnight per Anesthesia order.  Family at bedside.  No s/s of distress or pain.

## 2017-09-15 NOTE — DISCHARGE INSTRUCTIONS
Discharge Instructions    Discharged to home by car with relative. Discharged via wheelchair, hospital escort: Yes.  Special equipment needed: Not Applicable    Be sure to schedule a follow-up appointment with your primary care doctor or any specialists as instructed.     Discharge Plan:   Diet Plan: Discussed  Activity Level: Discussed  Confirmed Follow up Appointment: Patient to Call and Schedule Appointment  Confirmed Symptoms Management: Discussed  Medication Reconciliation Updated: Yes  Influenza Vaccine Indication: Patient Refuses    I understand that a diet low in cholesterol, fat, and sodium is recommended for good health. Unless I have been given specific instructions below for another diet, I accept this instruction as my diet prescription.       Special Instructions:   Ambulate as much as comfortable   Ok to shower 9/15, pat incision dry, leave open to air- no dressing   No Aspirin for 1 week after surgery   No driving for 2 weeks/ No driving while on narcotic medication   Over the counter stool softeners daily while on narcotics   No lifting greater than 15 pounds, no repetitive bending or twisting   Follow up at [facility] 2 weeks after surgery      · Is patient discharged on Warfarin / Coumadin?   No     · Is patient Post Blood Transfusion?  No    Depression / Suicide Risk    As you are discharged from this [facility] facility, it is important to learn how to keep safe from harming yourself.    Recognize the warning signs:  · Abrupt changes in personality, positive or negative- including increase in energy   · Giving away possessions  · Change in eating patterns- significant weight changes-  positive or negative  · Change in sleeping patterns- unable to sleep or sleeping all the time   · Unwillingness or inability to communicate  · Depression  · Unusual sadness, discouragement and loneliness  · Talk of wanting to die  · Neglect of personal appearance   · Rebelliousness- reckless  behavior  · Withdrawal from people/activities they love  · Confusion- inability to concentrate     If you or a loved one observes any of these behaviors or has concerns about self-harm, here's what you can do:  · Talk about it- your feelings and reasons for harming yourself  · Remove any means that you might use to hurt yourself (examples: pills, rope, extension cords, firearm)  · Get professional help from the community (Mental Health, Substance Abuse, psychological counseling)  · Do not be alone:Call your Safe Contact- someone whom you trust who will be there for you.  · Call your local CRISIS HOTLINE 594-1958 or 912-910-3764  · Call your local Children's Mobile Crisis Response Team [place] (148) 883-8683 or www.[employer]GIS  · Call the toll free National Suicide Prevention Hotlines   · National Suicide Prevention Lifeline 382-787-FVTK (1733)  · National Hope Line Network 800-SUICIDE (562-9775)

## 2017-09-15 NOTE — THERAPY
"Physical Therapy Evaluation completed.   Bed Mobility:  Supine to Sit: Supervised  Transfers: Sit to Stand: Supervised  Gait: Level Of Assist: Supervised with No Equipment Needed       Plan of Care: Patient with no further skilled PT needs in the acute care setting at this time  Discharge Recommendations: Equipment: No Equipment Needed. See below    Pt presents to PT s/p lumbar decompression. AFter initial evaluation and pt education, pt has no further skilled acute PT needs. He is able to demonstrate hallway ambulation with no AD as well as stair ambulation with SPv. He reports no concerns with functional ability to enter/exit and navigate home environment at this time and is planning to d/c later today or tomorrow. Anticipate no immediate skilled PT needs after medical d/c until precautions are lifted.    See \"Rehab Therapy-Acute\" Patient Summary Report for complete documentation.     "

## 2017-09-15 NOTE — PROGRESS NOTES
Pt discharged to home. Taken to family member's car via wheelchair. Hemovac removed prior to discharge; no other changes from morning assessment. Pt read and understood discharge instructions. Ana Meneses R.N.

## 2017-09-15 NOTE — THERAPY
"Occupational Therapy Evaluation completed.   Functional Status:  Pt s/p L2-5 lami, post inital education and training in spinal precautions able to demonstrate ability to perform ADLs with supervision. Pt required assist with socks management, reviewed LB AE for bathing and dressing task; stated understanding and verbalized will have spouse and children assist as needed. Pt with no further questions and concerns regardign ADLs at this time and does not demonstrate the need for further acute skilled services at this time.   Plan of Care: Patient with no further skilled OT needs in the acute care setting at this time  Discharge Recommendations:  Equipment: No Equipment Needed. Post-acute therapy Discharge to home with outpatient or home health for additional skilled therapy services. and Currently anticipate no further skilled therapy needs once patient is discharged from the inpatient setting.    See \"Rehab Therapy-Acute\" Patient Summary Report for complete documentation.    "

## 2017-09-15 NOTE — OP REPORT
DATE OF SERVICE:  09/14/2017    PREOPERATIVE DIAGNOSIS:  Severe lumbar stenosis with neurogenic claudication,   refractory to physical therapy and epidural steroids.    POSTOPERATIVE DIAGNOSES:  1.  Severe lumbar stenosis with neurogenic claudication, refractory to   physical therapy and epidural steroids.  2.  Pinhole durotomy from epidural steroid.    PROCEDURES:  1.  Decompressive lumbar laminectomy L2, L3, L4, L5.  2.  Medial facetectomy and foraminotomies at L2-L3, with decompression of L2   nerve roots bilaterally, L3-L4 with decompression of the L3 nerve roots   bilaterally L4-L5 with decompression of L5 nerve roots bilaterally and L5-S1   with decompression of S1 nerve roots bilaterally.  3.  Repair of durotomy with use of 6-0 [place]-Edenilson.    SURGEON:  Ck White MD    ASSISTANT:  MALENA Lai    ANESTHESIA:  General anesthetic.    ANESTHESIOLOGIST:  Carlitos Bell MD    PREPARATION:  Routine.    DESCRIPTION OF PROCEDURE:  Patient was brought to the operating room and   following induction, patient was intubated and placed under general   anesthetic, rolled prone onto the operating room table.  All pressure points   were padded.  Sequential stockings were in place.  The back was prepped and   draped in a sterile fashion.  Proposed incision site was injected with   Marcaine 0.5% with epinephrine.  A timeout was performed.  We did a midline   incision and carried it down through the subcutaneous tissue to L2, L3, L4,   L5, and S1.  We injected into the fascia with another 30 mL of Marcaine 0.5%   with epinephrine.  Subperiosteal dissection was carried out again over L2, L3,   L4, L5 and S1 with retractors placed to maintain exposure.  Meticulous   hemostasis was obtained.    We removed the spinous process of L2, L3, L4, L5, and S1.  We then drilled the   junction of the lamina facet at L2 bilaterally, L3 bilaterally, L4   bilaterally and L5 bilaterally.  Center portion of the bone with  the exception   of the superior aspect of the lamina was intact.  The facets on the left hand   side at L2-L3 and L3-L4 were partially disrupted, and there were some free   fragments that were removed.  We used a midline decompression to decompress   the thecal sac at L5, L4, L3, and L2.  We undercut the facet joints with #2,   #3, and #4 Kerrisons at L2-L3, L3-L4 and L4-L5 as well as L5-S1 and performed   foraminotomies bilaterally over L2, L3, L4, L5 and S1 nerve roots centrally at   L4-L5 with the thickest stenosis was where he had an epidural.  There was a   pinhole site that had minimal CSF leakage.  A 6-0 [place]-Edenilson was placed here to   suture it shut.  This was not a dural tear.    At this time, a fat pad was placed over followed by blue glue.  We obtained   meticulous hemostasis and then closed the fascia with #1 Vicryl suture,   subcutaneous tissue with 2-0 Vicryl, subcuticular with 3-0 Vicryl, and skin   with staples.  All sponge and needle counts were correct.  Estimated blood   loss was approximately 200 mL.       ____________________________________     MD EARNEST VERMA / KENDAL    DD:  09/14/2017 21:10:49  DT:  09/15/2017 00:44:16    D#:  [id number]  Job#:  [id number]

## 2017-09-15 NOTE — OR SURGEON
Operative Report    PreOp Diagnosis:LUMBAR STENOSIS WITH NEUROGENIC CLAUDICATION    PostOp Diagnosis: SAME    Procedure(s):  LUMBAR LAMINECTOMY DISKECTOMY- L2-5 POSTERIOR - Wound Class: Clean with Drain    Surgeon(s):  Ck White M.D.    Anesthesiologist/Type of Anesthesia:  Anesthesiologist: Carlitos Bell M.D./General    Surgical Staff:  Assistant: TORIE Lai  Circulator: Deirdre Apodaca R.N.; Kenzie Sinha R.N.  Relief Scrub: Carmen Phan  Scrub Person: Aline Johnson  Radiology Technologist: Freddy Calderon    Specimens:  * No specimens in log *    Estimated Blood Loss: 200    Findings: AS ABOVE. PINHOLE FROM EPIDURAL    Complications: NONE        9/14/2017 9:05 PM Ck White

## 2017-09-15 NOTE — PROGRESS NOTES
Neurosurgery Progress Note    Pt in bed, states he is doing ok, c/o surg site soreness, denies leg pain, ambulating, voiding, tolerating po    Exam:  AAO, cooperative, df/pf- 5/5, incision with drsg- c/d, hvac- 120ml    BP  Min: 113/81  Max: 129/82  Pulse  Av.9  Min: 65  Max: 91  Resp  Av.9  Min: 10  Max: 18  Temp  Av.3 °C (97.3 °F)  Min: 35.9 °C (96.6 °F)  Max: 36.8 °C (98.2 °F)  SpO2  Av.5 %  Min: 93 %  Max: 100 %    No Data Recorded        No results for input(s): SODIUM, POTASSIUM, CHLORIDE, CO2, GLUCOSE, BUN, CPKTOTAL in the last 72 hours.            Intake/Output       17 0700 - 09/15/17 0659 09/15/17 0700 - 17 0659      0955-5119 6147-5410 Total 3944-8967 7290-1111 Total       Intake    I.V.  1400  644 2044  --  -- --    Crystalloid Intake 1400 -- 1400 -- -- --    IV Volume -- 644 644 -- -- --    Total Intake 3950 489 2544 -- -- --       Output    Drains  --  310 310  --  -- --    Hemovac 1 -- 310 310 -- -- --    Total Output -- 310 310 -- -- --       Net I/O     1823 417 7772 -- -- --            Intake/Output Summary (Last 24 hours) at 09/15/17 0926  Last data filed at 09/15/17 0600   Gross per 24 hour   Intake             2044 ml   Output              310 ml   Net             1734 ml            • lidocaine-prilocaine  1 Application Once PRN      Or   • lidocaine  0.5 mL Once PRN     • LR   Continuous 10 mL/hr at 17 1300   • Pharmacy Consult Request  1 Each PRN     • MD ALERT...Do not administer NSAIDS or ASPIRIN unless ORDERED By Neurosurgery  1 Each PRN     • docusate sodium  100 mg BID     • senna-docusate  1 Tab Nightly     • senna-docusate  1 Tab Q24HRS PRN     • polyethylene glycol/lytes  1 Packet BID PRN     • magnesium hydroxide  30 mL QDAY PRN     • bisacodyl  10 mg Q24HRS PRN     • fleet  1 Each Once PRN     • 0.9 % NaCl with KCl 20 mEq 1,000 mL   Continuous 100 mL/hr at 09/15/17 0127   • oxycodone immediate release  10 mg Q3HRS PRN     • diphenhydrAMINE  25 mg  Q6HRS PRN      Or   • diphenhydrAMINE  25 mg Q6HRS PRN     • ondansetron  4 mg Q4HRS PRN     • ondansetron  4 mg Q4HRS PRN     • tizanidine  2 mg TID PRN     • clonidine  0.1 mg Q4HRS PRN     • benzocaine-menthol  1 Lozenge Q2HRS PRN     • calcium carbonate  500 mg BID     • diazepam  5 mg Q8HRS PRN     • morphine injection  2 mg Q2HRS PRN         Assessment and Plan:  Hospital day #1  POD #1 s/p L2-5 lami  Prophylactic anticoagulation: no         Start date/time: n/a  NM as above  Mobilize  Monitor drain- d/c when less than 60ml  Poss d/c home this afternoon if drain out

## 2017-12-17 ENCOUNTER — OFFICE VISIT (OUTPATIENT)
Dept: URGENT CARE | Facility: CLINIC | Age: 57
End: 2017-12-17
Payer: COMMERCIAL

## 2017-12-17 VITALS
HEIGHT: 70 IN | BODY MASS INDEX: 35.07 KG/M2 | RESPIRATION RATE: 20 BRPM | TEMPERATURE: 97.5 F | SYSTOLIC BLOOD PRESSURE: 120 MMHG | HEART RATE: 100 BPM | WEIGHT: 245 LBS | OXYGEN SATURATION: 96 % | DIASTOLIC BLOOD PRESSURE: 80 MMHG

## 2017-12-17 DIAGNOSIS — K12.2 UVULITIS: ICD-10-CM

## 2017-12-17 DIAGNOSIS — J40 BRONCHITIS: ICD-10-CM

## 2017-12-17 DIAGNOSIS — J02.9 SORE THROAT: ICD-10-CM

## 2017-12-17 PROCEDURE — 99214 OFFICE O/P EST MOD 30 MIN: CPT | Performed by: FAMILY MEDICINE

## 2017-12-17 RX ORDER — PREDNISONE 10 MG/1
30 TABLET ORAL EVERY MORNING
Qty: 21 TAB | Refills: 0 | Status: SHIPPED | OUTPATIENT
Start: 2017-12-17 | End: 2017-12-24

## 2017-12-17 RX ORDER — AZITHROMYCIN 250 MG/1
TABLET, FILM COATED ORAL
Qty: 6 TAB | Refills: 0 | Status: SHIPPED | OUTPATIENT
Start: 2017-12-17 | End: 2019-11-22

## 2017-12-17 ASSESSMENT — ENCOUNTER SYMPTOMS
CHILLS: 0
ORTHOPNEA: 0
HEMOPTYSIS: 0
FEVER: 0
DIZZINESS: 0
FOCAL WEAKNESS: 0
SHORTNESS OF BREATH: 0

## 2017-12-17 NOTE — PROGRESS NOTES
"Subjective:      Johnie Jarrell is a 57 y.o. male who presents with Pharyngitis (Almost a week stuffy nose , loss of balance)    Chief Complaint   Patient presents with   • Pharyngitis     Almost a week stuffy nose , loss of balance        - This is a very pleasant 57 y.o. male with complaints of 7-10 days w/ sore throat and cough, no NVFC          ALLERGIES:  Pcn [penicillins]     PMH:  Past Medical History:   Diagnosis Date   • Allergy, unspecified not elsewhere classified         MEDS:    Current Outpatient Prescriptions:   •  azithromycin (ZITHROMAX) 250 MG Tab, Use as directed, Disp: 6 Tab, Rfl: 0  •  predniSONE (DELTASONE) 10 MG Tab, Take 3 Tabs by mouth every morning for 7 days., Disp: 21 Tab, Rfl: 0  •  Hydrocod Polst-CPM Polst ER (TUSSIONEX) 10-8 MG/5ML Suspension Extended Release, Take 5 mL by mouth every 12 hours as needed for Cough for up to 7 days., Disp: 120 mL, Rfl: 0  •  oxycodone immediate release (ROXICODONE) 10 MG immediate release tablet, Take 1 Tab by mouth every four hours as needed for Moderate Pain., Disp: 80 Tab, Rfl: 0  •  tizanidine (ZANAFLEX) 2 MG tablet, Take 1 Tab by mouth every 6 hours as needed., Disp: 60 Tab, Rfl: 0    ** I have documented what I find to be significant in regards to past medical, social, family and surgical history  in my HPI or under PMH/PSH/FH review section, otherwise it is contributory **           HPI    Review of Systems   Constitutional: Negative for chills and fever.   Respiratory: Negative for hemoptysis and shortness of breath.    Cardiovascular: Negative for chest pain and orthopnea.   Neurological: Negative for dizziness and focal weakness.          Objective:     /80   Pulse 100   Temp 36.4 °C (97.5 °F)   Resp 20   Ht 1.778 m (5' 10\")   Wt 111.1 kg (245 lb)   SpO2 96%   BMI 35.15 kg/m²      Physical Exam   Constitutional: He appears well-developed. No distress.   HENT:   Head: Normocephalic and atraumatic.   Mouth/Throat: Oropharynx " is clear and moist.   Eyes: Conjunctivae are normal.   Neck: Neck supple.   Cardiovascular: Regular rhythm.    No murmur heard.  Pulmonary/Chest: Effort normal. No respiratory distress.   Neurological: He is alert. He exhibits normal muscle tone.   Skin: Skin is warm and dry.   Psychiatric: He has a normal mood and affect. Judgment normal.   Nursing note and vitals reviewed.  uvula injected w/ edema             Assessment/Plan:         1. Sore throat     2. Uvulitis  predniSONE (DELTASONE) 10 MG Tab   3. Bronchitis  azithromycin (ZITHROMAX) 250 MG Tab    predniSONE (DELTASONE) 10 MG Tab    Hydrocod Polst-CPM Polst ER (TUSSIONEX) 10-8 MG/5ML Suspension Extended Release             Dx & d/c instructions discussed w/ patient and/or family members. Follow up w/ Prvt Dr or here in 3-4 days if not getting better, sooner if needed,  ER if worse and UC/PCP unavailable.        Possible side effects (i.e. Rash, GI upset/constipation, sedation, elevation of BP or sugars) of any medications given discussed.

## 2019-11-22 ENCOUNTER — OFFICE VISIT (OUTPATIENT)
Dept: URGENT CARE | Facility: CLINIC | Age: 59
End: 2019-11-22

## 2019-11-22 VITALS
BODY MASS INDEX: 37.65 KG/M2 | OXYGEN SATURATION: 94 % | TEMPERATURE: 98.7 F | WEIGHT: 263 LBS | SYSTOLIC BLOOD PRESSURE: 132 MMHG | DIASTOLIC BLOOD PRESSURE: 76 MMHG | RESPIRATION RATE: 18 BRPM | HEART RATE: 78 BPM | HEIGHT: 70 IN

## 2019-11-22 DIAGNOSIS — J22 LRTI (LOWER RESPIRATORY TRACT INFECTION): ICD-10-CM

## 2019-11-22 PROCEDURE — 99214 OFFICE O/P EST MOD 30 MIN: CPT | Performed by: FAMILY MEDICINE

## 2019-11-22 RX ORDER — BENZONATATE 100 MG/1
100 CAPSULE ORAL 3 TIMES DAILY PRN
Qty: 60 CAP | Refills: 0 | Status: SHIPPED | OUTPATIENT
Start: 2019-11-22

## 2019-11-22 RX ORDER — AZITHROMYCIN 250 MG/1
TABLET, FILM COATED ORAL
Qty: 6 TAB | Refills: 0 | Status: SHIPPED | OUTPATIENT
Start: 2019-11-22

## 2019-11-22 ASSESSMENT — ENCOUNTER SYMPTOMS
CHILLS: 0
WHEEZING: 1
COUGH: 1
FEVER: 0
SHORTNESS OF BREATH: 1

## 2019-11-22 NOTE — PROGRESS NOTES
"Subjective:   Johnie Jarrell  is a 59 y.o. male who presents for Cough (dry)        Cough   This is a new problem. The current episode started 1 to 4 weeks ago. The problem has been gradually worsening. The problem occurs every few minutes. The cough is non-productive. Associated symptoms include shortness of breath and wheezing. Pertinent negatives include no chills, fever, nasal congestion or postnasal drip.     Review of Systems   Constitutional: Negative for chills and fever.   HENT: Negative for postnasal drip.    Respiratory: Positive for cough, shortness of breath and wheezing.      Allergies   Allergen Reactions   • Pcn [Penicillins] Rash     Rxn - as an infant        Objective:   /76 (BP Location: Right arm, Patient Position: Sitting, BP Cuff Size: Adult long)   Pulse 78   Temp 37.1 °C (98.7 °F) (Temporal)   Resp 18   Ht 1.778 m (5' 10\")   Wt 119.3 kg (263 lb)   SpO2 94%   BMI 37.74 kg/m²   Physical Exam  Constitutional:       General: He is not in acute distress.     Appearance: He is well-developed.   HENT:      Head: Normocephalic and atraumatic.   Eyes:      Conjunctiva/sclera: Conjunctivae normal.      Pupils: Pupils are equal, round, and reactive to light.   Cardiovascular:      Rate and Rhythm: Normal rate and regular rhythm.      Heart sounds: No murmur.   Pulmonary:      Effort: Pulmonary effort is normal. No respiratory distress.      Breath sounds: No stridor. Wheezing present. No rhonchi or rales.   Abdominal:      General: There is no distension.      Palpations: Abdomen is soft.      Tenderness: There is no tenderness.   Skin:     General: Skin is warm and dry.   Neurological:      Mental Status: He is alert and oriented to person, place, and time.      Sensory: No sensory deficit.      Deep Tendon Reflexes: Reflexes are normal and symmetric.           Assessment/Plan:   1. LRTI (lower respiratory tract infection)  - azithromycin (ZITHROMAX) 250 MG Tab; Take 2 tablets by " mouth on day one. Take one tablet by mouth the remaining days until gone  Dispense: 6 Tab; Refill: 0  - benzonatate (TESSALON) 100 MG Cap; Take 1 Cap by mouth 3 times a day as needed for Cough.  Dispense: 60 Cap; Refill: 0    Differential diagnosis, natural history, supportive care, and indications for immediate follow-up discussed.

## 2019-11-22 NOTE — PATIENT INSTRUCTIONS

## 2021-03-15 DIAGNOSIS — Z23 NEED FOR VACCINATION: ICD-10-CM

## 2021-10-08 ENCOUNTER — HOSPITAL ENCOUNTER (OUTPATIENT)
Dept: LAB | Facility: MEDICAL CENTER | Age: 61
End: 2021-10-08
Attending: FAMILY MEDICINE
Payer: OTHER MISCELLANEOUS

## 2021-10-08 LAB
CHOLEST SERPL-MCNC: 172 MG/DL (ref 100–199)
EST. AVERAGE GLUCOSE BLD GHB EST-MCNC: 120 MG/DL
FASTING STATUS PATIENT QL REPORTED: NORMAL
HBA1C MFR BLD: 5.8 % (ref 4–5.6)
HDLC SERPL-MCNC: 52 MG/DL
LDLC SERPL CALC-MCNC: 107 MG/DL
TRIGL SERPL-MCNC: 65 MG/DL (ref 0–149)

## 2021-10-08 PROCEDURE — 86769 SARS-COV-2 COVID-19 ANTIBODY: CPT

## 2021-10-08 PROCEDURE — 83036 HEMOGLOBIN GLYCOSYLATED A1C: CPT

## 2021-10-08 PROCEDURE — 36415 COLL VENOUS BLD VENIPUNCTURE: CPT

## 2021-10-08 PROCEDURE — 80061 LIPID PANEL: CPT

## 2021-10-11 LAB — SARS-COV-2 IGG SERPLBLD QL IA.RAPID: NEGATIVE

## 2022-06-02 ENCOUNTER — HOSPITAL ENCOUNTER (EMERGENCY)
Facility: MEDICAL CENTER | Age: 62
End: 2022-06-02
Attending: EMERGENCY MEDICINE
Payer: OTHER MISCELLANEOUS

## 2022-06-02 ENCOUNTER — APPOINTMENT (OUTPATIENT)
Dept: RADIOLOGY | Facility: MEDICAL CENTER | Age: 62
End: 2022-06-02
Attending: EMERGENCY MEDICINE

## 2022-06-02 VITALS
TEMPERATURE: 98.3 F | RESPIRATION RATE: 16 BRPM | SYSTOLIC BLOOD PRESSURE: 113 MMHG | DIASTOLIC BLOOD PRESSURE: 83 MMHG | OXYGEN SATURATION: 93 % | HEART RATE: 93 BPM

## 2022-06-02 DIAGNOSIS — R53.83 OTHER FATIGUE: ICD-10-CM

## 2022-06-02 DIAGNOSIS — R05.9 COUGH: ICD-10-CM

## 2022-06-02 LAB
FLUAV RNA SPEC QL NAA+PROBE: NEGATIVE
FLUBV RNA SPEC QL NAA+PROBE: NEGATIVE
RSV RNA SPEC QL NAA+PROBE: NEGATIVE
SARS-COV-2 RNA RESP QL NAA+PROBE: NOTDETECTED
SPECIMEN SOURCE: NORMAL

## 2022-06-02 PROCEDURE — C9803 HOPD COVID-19 SPEC COLLECT: HCPCS | Performed by: EMERGENCY MEDICINE

## 2022-06-02 PROCEDURE — 71045 X-RAY EXAM CHEST 1 VIEW: CPT

## 2022-06-02 PROCEDURE — 700102 HCHG RX REV CODE 250 W/ 637 OVERRIDE(OP): Performed by: EMERGENCY MEDICINE

## 2022-06-02 PROCEDURE — A9270 NON-COVERED ITEM OR SERVICE: HCPCS | Performed by: EMERGENCY MEDICINE

## 2022-06-02 PROCEDURE — 99283 EMERGENCY DEPT VISIT LOW MDM: CPT

## 2022-06-02 PROCEDURE — 0241U HCHG SARS-COV-2 COVID-19 NFCT DS RESP RNA 4 TRGT MIC: CPT

## 2022-06-02 RX ORDER — GUAIFENESIN 600 MG/1
600 TABLET, EXTENDED RELEASE ORAL ONCE
Status: COMPLETED | OUTPATIENT
Start: 2022-06-02 | End: 2022-06-02

## 2022-06-02 RX ORDER — GUAIFENESIN 600 MG/1
600 TABLET, EXTENDED RELEASE ORAL EVERY 12 HOURS
Qty: 28 TABLET | Refills: 0 | Status: SHIPPED | OUTPATIENT
Start: 2022-06-02

## 2022-06-02 RX ADMIN — GUAIFENESIN 600 MG: 600 TABLET, EXTENDED RELEASE ORAL at 23:34

## 2022-06-02 ASSESSMENT — PAIN DESCRIPTION - PAIN TYPE: TYPE: ACUTE PAIN

## 2022-06-03 NOTE — ED TRIAGE NOTES
Bib self for above complaints.     Chief Complaint   Patient presents with   • Cough     Approx 10 days, went to  tested for viruses and tested negative, given tessalon for cough but has not helped.      /89   Pulse 98   Temp 36.5 °C (97.7 °F) (Temporal)   Resp 20   SpO2 93%     Pt not vaccinated for covid.

## 2022-06-03 NOTE — ED NOTES
Pt discharged with instructions and script.  Pt verbalized understanding of discharge instructions.  Pt wanted to switch PCP.  Pt advised to call scheduling for help.  Pt ambulated out of ED

## 2022-06-03 NOTE — ED PROVIDER NOTES
ED Provider Note  ED Provider Note    Scribed for Olivre Brito by Oliver Brito. 6/2/2022  10:12 PM    Primary care provider: Karl Brito M.D.  Means of arrival: Private vehicle  History obtained from: Patient  History limited by: None    CHIEF COMPLAINT  Chief Complaint   Patient presents with   • Cough     Approx 10 days, went to  tested for viruses and tested negative, given tessalon for cough but has not helped.      HPI  Johnie Jarrell is a 61 y.o. male who presents to the Emergency Department with no significant past medical history, takes no medications, presenting for persistent and bothersome cough for 10 days.  He states he was seen 8 days ago urgent care with a normal x-ray and tested negative for flu and COVID but this is only 24 to 48 hours into his illness.  He is not vaccinated against COVID or influenza.  No known sick contacts.  He has some mild fatigue in the morning that wears off through the day.  Denies nausea, vomiting, headache, fevers, chest pain, shortness of breath, abdominal pain, diarrhea, constipation, dysuria, hematuria.  Denies alcohol drug or tobacco abuse to me.  Review of systems otherwise negative.  No recent travel, no sick contacts that he is aware of.  He has been taking Tessalon Perles and is frustrated because he cannot sleep at night due to the cough.  Quality: Dry cough  Duration: 10 days  Severity: Mild  Associated sx: Fatigue    REVIEW OF SYSTEMS  As above, all other systems reviewed and are negative.   See HPI for further details.     PAST MEDICAL HISTORY   has a past medical history of Allergy, unspecified not elsewhere classified and Patient denies medical problems.  SURGICAL HISTORY   has a past surgical history that includes lumbar laminectomy diskectomy (9/14/2017).  SOCIAL HISTORY  Social History     Tobacco Use   • Smoking status: Never Smoker   • Smokeless tobacco: Never Used   Vaping Use   • Vaping Use: Never used   Substance Use  Topics   • Alcohol use: No     Alcohol/week: 0.0 oz   • Drug use: No      Social History     Substance and Sexual Activity   Drug Use No     FAMILY HISTORY  History reviewed. No pertinent family history.  CURRENT MEDICATIONS  Home Medications     Reviewed by Ernst Monreal R.N. (Registered Nurse) on 06/02/22 at 2107  Med List Status: Not Addressed   Medication Last Dose Status   azithromycin (ZITHROMAX) 250 MG Tab  Active   benzonatate (TESSALON) 100 MG Cap  Active   oxycodone immediate release (ROXICODONE) 10 MG immediate release tablet  Active   tizanidine (ZANAFLEX) 2 MG tablet  Active              ALLERGIES  Allergies   Allergen Reactions   • Pcn [Penicillins] Rash     Rxn - as an infant       PHYSICAL EXAM  VITAL SIGNS:   Vitals:    06/02/22 2109 06/02/22 2145   BP: 128/89 136/77   Pulse: 98 90   Resp: 20    Temp: 36.5 °C (97.7 °F)    TempSrc: Temporal    SpO2: 93% 92%     Vitals: My interpretation: normotensive, not tachycardic, afebrile, not hypoxic    Reinterpretation of vitals: Unchanged    Cardiac Monitor Interpretation: The cardiac monitor revealed normal Sinus Rhythm as interpreted by me. The cardiac monitor was ordered secondary to the patient's history of cough and to monitor for dysrhythmia and/or tachycardia.    PE:   Gen: sitting comfortably, speaking clearly, appears in no acute distress  ENT: Mucous membranes moist, posterior pharynx clear, uvula midline, nares patent bilaterally   Neck: Supple, FROM  Pulmonary: Lungs are clear to auscultation bilaterally. No tachypnea  CV:  RRR, no murmur appreciated, pulses 2+ in both upper and lower extremities  Abdomen: soft, NT/ND; no rebound/guarding  : no CVA or suprapubic tenderness   Neuro: A&Ox4 (person, place, time, situation), speech fluent, gait steady, no focal deficits appreciated  Skin: No rash or lesions.  No pallor or jaundice.  No cyanosis.  Warm and dry.     DIAGNOSTIC STUDIES / PROCEDURES    LABS  Results for orders placed or performed  during the hospital encounter of 06/02/22   CoV-2, FLU A/B, and RSV by PCR (2-4 Hours CEPHEID) : Collect NP swab in VTM    Specimen: Respirate   Result Value Ref Range    Influenza virus A RNA Negative Negative    Influenza virus B, PCR Negative Negative    RSV, PCR Negative Negative    SARS-CoV-2 by PCR NotDetected     SARS-CoV-2 Source NP Swab       All labs reviewed by me. Significant for pending     RADIOLOGY  DX-CHEST-PORTABLE (1 VIEW)   Final Result      LEFT basilar atelectasis overlying an elevated LEFT diaphragm, similar to prior        The radiologist's interpretation of all radiological studies have been reviewed by me.    COURSE & MEDICAL DECISION MAKING  Nursing notes, VS, PMSFHx, labs, imaging, EKG reviewed in chart.    MDM: 10:12 PM Johnie Jarrell is a 61 y.o. male who presented with dry cough for 10 days.  Patient is not vaccinated against COVID or flu.  Has mild fatigue but otherwise is completely on remarkable review of systems.  Vital signs are within normal limits and he denies a history of fever.  No chest pain or shortness of breath.  He has benign physical exam, lungs are clear and he is very well-appearing.  He is frustrated because he cannot sleep due to having coughing fits at night as well as during the day.  Chest x-ray was done.  He is asking for COVID and flu swab.  These were ordered.  Chest x-ray is unremarkable. COVID / FLU negative.  He was given and prescribed guaifenesin to help with his cough.  Encouraged to continue taking his Tessalon Perles.  There is no signs of pneumonia, he is well-appearing, has a benign physical exam, afebrile with normal vital signs, he is appropriate for discharge from the ED.  No chest pain or shortness of breath.  Likely viral in nature encouraged him with conservative management at home and close outpatient follow-up.  Return precautions were discussed with him and he verbalized understanding.    FINAL IMPRESSION  1. Cough Acute   2. Other  fatigue Acute      The note accurately reflects work and decisions made by me.  Oliver Brito  6/2/2022  10:14 PM

## 2022-06-03 NOTE — ED NOTES
PT ambulated to the room without issue.  PT placed into a gown, into bed in a position of comfort, bed rails raised, call button within reach. PT offered a blanket.  Vitals obtained and charted.

## 2022-06-03 NOTE — DISCHARGE INSTRUCTIONS
I want to keep using Tessalon Perles.  I want you to fill the prescription I sent to the pharmacy for guaifenesin which should help with your cough.  This is likely viral in nature.  Your COVID and flu are negative.  Chest x-ray was unremarkable.  Come back to emergency department if you have worsening symptoms.  You for coming in today.

## 2023-03-22 ENCOUNTER — HOSPITAL ENCOUNTER (OUTPATIENT)
Dept: LAB | Facility: MEDICAL CENTER | Age: 63
End: 2023-03-22
Attending: FAMILY MEDICINE
Payer: OTHER MISCELLANEOUS

## 2024-01-01 ENCOUNTER — OFFICE VISIT (OUTPATIENT)
Dept: URGENT CARE | Facility: CLINIC | Age: 64
End: 2024-01-01
Payer: OTHER MISCELLANEOUS

## 2024-01-01 VITALS
HEART RATE: 70 BPM | RESPIRATION RATE: 16 BRPM | HEIGHT: 70 IN | TEMPERATURE: 96.8 F | OXYGEN SATURATION: 98 % | DIASTOLIC BLOOD PRESSURE: 72 MMHG | WEIGHT: 215 LBS | BODY MASS INDEX: 30.78 KG/M2 | SYSTOLIC BLOOD PRESSURE: 108 MMHG

## 2024-01-01 DIAGNOSIS — B02.9 HERPES ZOSTER WITHOUT COMPLICATION: ICD-10-CM

## 2024-01-01 PROCEDURE — 3078F DIAST BP <80 MM HG: CPT | Performed by: NURSE PRACTITIONER

## 2024-01-01 PROCEDURE — 3074F SYST BP LT 130 MM HG: CPT | Performed by: NURSE PRACTITIONER

## 2024-01-01 PROCEDURE — 99203 OFFICE O/P NEW LOW 30 MIN: CPT | Performed by: NURSE PRACTITIONER

## 2024-01-01 RX ORDER — VALACYCLOVIR HYDROCHLORIDE 1 G/1
1000 TABLET, FILM COATED ORAL 3 TIMES DAILY
Qty: 21 TABLET | Refills: 0 | Status: SHIPPED | OUTPATIENT
Start: 2024-01-01 | End: 2024-01-08

## 2024-01-01 NOTE — PROGRESS NOTES
"Johnie Jarrell is a 63 y.o. male who presents for Rash (Started yesterday, \" Rash is on the back of my right arm and on my armpit. I am concerned about shingles.\" )      HPI  This is a new problem. Johnie Jarrell is a 63 y.o. patient who presents to urgent care with c/o: Rash started on his right upper chest and under his arm and on the back of his right arm.  He is concerned that he may have shingles infection.  He is uncertain if he had varicella infection as a child.  He did receive the shingles vaccination years ago.  Treatments tried none.  He reports the rash is burning and a little painful.  No other aggravating relieving factors.    ROS See HPI    Allergies:       Allergies   Allergen Reactions    Pcn [Penicillins] Rash     Rxn - as an infant         PMSFS Hx:  Past Medical History:   Diagnosis Date    Allergy, unspecified not elsewhere classified     Patient denies medical problems      Past Surgical History:   Procedure Laterality Date    LUMBAR LAMINECTOMY DISKECTOMY  9/14/2017    Procedure: LUMBAR LAMINECTOMY DISKECTOMY- L2-5 POSTERIOR;  Surgeon: Ck White M.D.;  Location: SURGERY [facility];  Service: Neurosurgery     History reviewed. No pertinent family history.  Social History     Tobacco Use    Smoking status: Never    Smokeless tobacco: Never   Substance Use Topics    Alcohol use: No     Alcohol/week: 0.0 oz       Problems:   Patient Active Problem List   Diagnosis    Mixed hyperlipidemia    Leg pain, left- since 2014; neg US venous, 2015- ref pmr- no relief with groin and lumbar injectios; ref to neuro neuro surg dr white    Chronic left-sided low back pain with left-sided sciatica    Obesity (BMI 30-39.9)    Spinal stenosis, lumbar region, with neurogenic claudication- dr white do laminectomy 2017    IFG (impaired fasting glucose)    Spinal stenosis, lumbar region, without neurogenic claudication       Medications:   Current Outpatient Medications on File Prior to Visit " "  Medication Sig Dispense Refill    guaiFENesin ER (MUCINEX) 600 MG TABLET SR 12 HR Take 1 Tablet by mouth every 12 hours. (Patient not taking: Reported on 1/1/2024) 28 Tablet 0    Dextromethorphan-guaiFENesin (GUAIFENESIN DM) 400-20 MG Tab Take 1 Units by mouth at bedtime. (Patient not taking: Reported on 1/1/2024) 56 Tablet 0    azithromycin (ZITHROMAX) 250 MG Tab Take 2 tablets by mouth on day one. Take one tablet by mouth the remaining days until gone (Patient not taking: Reported on 1/1/2024) 6 Tab 0    benzonatate (TESSALON) 100 MG Cap Take 1 Cap by mouth 3 times a day as needed for Cough. (Patient not taking: Reported on 1/1/2024) 60 Cap 0    oxycodone immediate release (ROXICODONE) 10 MG immediate release tablet Take 1 Tab by mouth every four hours as needed for Moderate Pain. (Patient not taking: Reported on 1/1/2024) 80 Tab 0     No current facility-administered medications on file prior to visit.        Objective:     /72 (BP Location: Left arm, Patient Position: Sitting, BP Cuff Size: Adult long)   Pulse 70   Temp 36 °C (96.8 °F) (Temporal)   Resp 16   Ht 1.778 m (5' 10\")   Wt 97.5 kg (215 lb)   SpO2 98%   BMI 30.85 kg/m²     Physical Exam  Vitals and nursing note reviewed.   Constitutional:       Appearance: He is well-developed.   Cardiovascular:      Rate and Rhythm: Normal rate.   Pulmonary:      Effort: Pulmonary effort is normal.   Skin:     General: Skin is warm.      Capillary Refill: Capillary refill takes less than 2 seconds.      Findings: Rash present. Rash is vesicular.          Neurological:      Mental Status: He is alert and oriented to person, place, and time.   Psychiatric:         Behavior: Behavior normal.         Thought Content: Thought content normal.         Assessment /Associated Orders:      1. Herpes zoster without complication  valacyclovir (VALTREX) 1 GM Tab          Medical Decision Making:    Johnie is a very pleasant 63 y.o. male who is clinically stable at " today's acute urgent care visit.  No acute distress noted.  VSS. Appropriate for outpatient care at this time.   Acute problem today with uncertain prognosis.   Educated in proper administration of  prescription medication(s) ordered today including safety, possible SE, risks, benefits, rationale and alternatives to therapy.   Advised to avoid immunocompromised, children without hx of varicella and/or vaccine, and pregnant women. Advised not to touch rash.  May cover rash prn.  Keep clean and dry.  Do not apply topical creams/ointments.       Discussed Dx, management options (risks,benefits, and alternatives to planned treatment), natural progression and supportive care.  Expressed understanding and the treatment plan was agreed upon.   Questions were encouraged and answered   Return to urgent care prn if new or worsening sx or if there is no improvement in condition prn.              Please note that this dictation was created using voice recognition software. I have worked with consultants from the vendor as well as technical experts from [facility] to optimize the interface. I have made every reasonable attempt to correct obvious errors, but I expect that there are errors of grammar and possibly content that I did not discover before finalizing the note.  This note was electronically signed by provider

## 2024-04-29 ENCOUNTER — APPOINTMENT (OUTPATIENT)
Dept: LAB | Facility: MEDICAL CENTER | Age: 64
End: 2024-04-29
Attending: FAMILY MEDICINE

## (undated) DEVICE — GLOVE BIOGEL PI INDICATOR SZ 7.0 SURGICAL PF LF - (50/BX 4BX/CA)

## (undated) DEVICE — BLADE SURGICAL CLIPPER - (50EA/CA)

## (undated) DEVICE — ELECTRODE DUAL RETURN W/ CORD - (50/PK)

## (undated) DEVICE — LACTATED RINGERS INJ 1000 ML - (14EA/CA 60CA/PF)

## (undated) DEVICE — DRESSING PETROLEUM GAUZE 5 X 9" (50EA/BX 4BX/CA)"

## (undated) DEVICE — SUCTION INSTRUMENT YANKAUER BULBOUS TIP W/O VENT (50EA/CA)

## (undated) DEVICE — SENSOR SPO2 NEO LNCS ADHESIVE (20/BX) SEE USER NOTES

## (undated) DEVICE — SODIUM CHL IRRIGATION 0.9% 1000ML (12EA/CA)

## (undated) DEVICE — CHLORAPREP 26 ML APPLICATOR - ORANGE TINT(25/CA)

## (undated) DEVICE — TOOL DISSECT MATCH HEAD

## (undated) DEVICE — TUBING CLEARLINK DUO-VENT - C-FLO (48EA/CA)

## (undated) DEVICE — KIT ANESTHESIA W/CIRCUIT & 3/LT BAG W/FILTER (20EA/CA)

## (undated) DEVICE — GLOVE BIOGEL SZ 8.5 SURGICAL PF LTX - (50PR/BX 4BX/CA)

## (undated) DEVICE — MIDAS LUBRICATOR DIFFUSER PACK (4EA/CA)

## (undated) DEVICE — SUTURE CV

## (undated) DEVICE — MASK ANESTHESIA ADULT  - (100/CA)

## (undated) DEVICE — SLEEVE, VASO, THIGH, MED

## (undated) DEVICE — GLOVE BIOGEL PI INDICATOR SZ 8.5 SURGICAL PF LF - (50PR/BX 4BX/CA)

## (undated) DEVICE — HEADREST PRONEVIEW LARGE - (10/CA)

## (undated) DEVICE — GLOVE BIOGEL PI ORTHO SZ 6 1/2 SURGICAL PF LF (40PR/BX)

## (undated) DEVICE — SPONGE GAUZESTER 4 X 4 4PLY - (128PK/CA)

## (undated) DEVICE — KIT SURGIFLO W/OUT THROMBIN - (6EA/CA)

## (undated) DEVICE — GLOVE BIOGEL PI INDICATOR SZ 6.5 SURGICAL PF LF - (50/BX 4BX/CA)

## (undated) DEVICE — SUTURE 2-0 VICRYL PLUS CT-1 - 8 X 18 INCH(12/BX)

## (undated) DEVICE — NEEDLE NON-SAFETY HYPO 21 GA X 1 1/2 IN HYPO (100/BX)

## (undated) DEVICE — SUTURE GENERAL

## (undated) DEVICE — ELECTRODE 850 FOAM ADHESIVE - HYDROGEL RADIOTRNSPRNT (50/PK)

## (undated) DEVICE — SET EXTENSION WITH 2 PORTS (48EA/CA) ***PART #2C8610 IS A SUBSTITUTE*****

## (undated) DEVICE — GOWN SURGEONS LARGE - (32/CA)

## (undated) DEVICE — LACTATED RINGERS INJ. 500 ML - (24EA/CA)

## (undated) DEVICE — DRAPE LAPAROTOMY T SHEET - (12EA/CA)

## (undated) DEVICE — CANISTER SUCTION 3000ML MECHANICAL FILTER AUTO SHUTOFF MEDI-VAC NONSTERILE LF DISP  (40EA/CA)

## (undated) DEVICE — GLOVE BIOGEL INDICATOR SZ 7SURGICAL PF LTX - (50/BX 4BX/CA)

## (undated) DEVICE — PROTECTOR ULNA NERVE - (36PR/CA)

## (undated) DEVICE — SUTURE 3-0 VICRYL PLUS RB-1 - 8 X 18 INCH (12/BX)

## (undated) DEVICE — ARMREST CRADLE FOAM - (2PR/PK 12PR/CA)

## (undated) DEVICE — KIT EVACUATER 3 SPRING PVC LF 1/8 DRAIN SIZE (10EA/CA)"

## (undated) DEVICE — GLOVE BIOGEL SZ 6.5 SURGICAL PF LTX (50PR/BX 4BX/CA)

## (undated) DEVICE — SUTURE GOR-TEX CV-6 TT-9 (36PK/BX)

## (undated) DEVICE — SUTURE 1 VICRYL PLUS CTX - 8 X 18 INCH (12/BX)

## (undated) DEVICE — NEPTUNE 4 PORT MANIFOLD - (20/PK)

## (undated) DEVICE — TUBING C&T SET FLYING LEADS DRAIN TUBING (10EA/BX)

## (undated) DEVICE — TUBE E-T HI-LO CUFF 8.0MM (10EA/PK)

## (undated) DEVICE — SUTURE 8-0 PROLENE BV175-8 EVERPONT